# Patient Record
Sex: MALE | Race: WHITE | NOT HISPANIC OR LATINO
[De-identification: names, ages, dates, MRNs, and addresses within clinical notes are randomized per-mention and may not be internally consistent; named-entity substitution may affect disease eponyms.]

---

## 2017-02-03 ENCOUNTER — APPOINTMENT (OUTPATIENT)
Dept: CARDIOLOGY | Facility: CLINIC | Age: 77
End: 2017-02-03

## 2017-02-03 VITALS — RESPIRATION RATE: 18 BRPM | DIASTOLIC BLOOD PRESSURE: 80 MMHG | HEART RATE: 68 BPM | SYSTOLIC BLOOD PRESSURE: 130 MMHG

## 2017-02-03 VITALS — WEIGHT: 231 LBS | BODY MASS INDEX: 35.01 KG/M2 | HEIGHT: 68 IN

## 2017-05-30 ENCOUNTER — OUTPATIENT (OUTPATIENT)
Dept: OUTPATIENT SERVICES | Facility: HOSPITAL | Age: 77
LOS: 1 days | Discharge: HOME | End: 2017-05-30

## 2017-06-02 ENCOUNTER — APPOINTMENT (OUTPATIENT)
Dept: CARDIOLOGY | Facility: CLINIC | Age: 77
End: 2017-06-02

## 2017-06-02 VITALS — BODY MASS INDEX: 33.34 KG/M2 | HEIGHT: 68 IN | WEIGHT: 220 LBS

## 2017-06-02 VITALS — HEART RATE: 64 BPM | SYSTOLIC BLOOD PRESSURE: 130 MMHG | DIASTOLIC BLOOD PRESSURE: 80 MMHG | RESPIRATION RATE: 18 BRPM

## 2017-06-28 DIAGNOSIS — E53.8 DEFICIENCY OF OTHER SPECIFIED B GROUP VITAMINS: ICD-10-CM

## 2017-06-28 DIAGNOSIS — D50.9 IRON DEFICIENCY ANEMIA, UNSPECIFIED: ICD-10-CM

## 2017-06-28 DIAGNOSIS — E78.00 PURE HYPERCHOLESTEROLEMIA, UNSPECIFIED: ICD-10-CM

## 2017-06-28 DIAGNOSIS — D64.9 ANEMIA, UNSPECIFIED: ICD-10-CM

## 2017-06-28 DIAGNOSIS — E11.9 TYPE 2 DIABETES MELLITUS WITHOUT COMPLICATIONS: ICD-10-CM

## 2017-09-21 ENCOUNTER — OUTPATIENT (OUTPATIENT)
Dept: OUTPATIENT SERVICES | Facility: HOSPITAL | Age: 77
LOS: 1 days | Discharge: HOME | End: 2017-09-21

## 2017-09-21 DIAGNOSIS — E53.8 DEFICIENCY OF OTHER SPECIFIED B GROUP VITAMINS: ICD-10-CM

## 2017-09-21 DIAGNOSIS — E11.9 TYPE 2 DIABETES MELLITUS WITHOUT COMPLICATIONS: ICD-10-CM

## 2017-09-21 DIAGNOSIS — E78.5 HYPERLIPIDEMIA, UNSPECIFIED: ICD-10-CM

## 2017-09-21 DIAGNOSIS — R55 SYNCOPE AND COLLAPSE: ICD-10-CM

## 2017-09-21 DIAGNOSIS — N39.0 URINARY TRACT INFECTION, SITE NOT SPECIFIED: ICD-10-CM

## 2017-09-21 DIAGNOSIS — N40.0 BENIGN PROSTATIC HYPERPLASIA WITHOUT LOWER URINARY TRACT SYMPTOMS: ICD-10-CM

## 2017-09-21 DIAGNOSIS — I10 ESSENTIAL (PRIMARY) HYPERTENSION: ICD-10-CM

## 2017-09-21 DIAGNOSIS — D50.9 IRON DEFICIENCY ANEMIA, UNSPECIFIED: ICD-10-CM

## 2017-09-21 DIAGNOSIS — E05.90 THYROTOXICOSIS, UNSPECIFIED WITHOUT THYROTOXIC CRISIS OR STORM: ICD-10-CM

## 2017-09-21 DIAGNOSIS — I25.10 ATHEROSCLEROTIC HEART DISEASE OF NATIVE CORONARY ARTERY WITHOUT ANGINA PECTORIS: ICD-10-CM

## 2017-09-21 DIAGNOSIS — D64.9 ANEMIA, UNSPECIFIED: ICD-10-CM

## 2017-09-21 DIAGNOSIS — N20.0 CALCULUS OF KIDNEY: ICD-10-CM

## 2017-09-22 ENCOUNTER — APPOINTMENT (OUTPATIENT)
Dept: CARDIOLOGY | Facility: CLINIC | Age: 77
End: 2017-09-22

## 2017-09-22 VITALS — WEIGHT: 218 LBS | HEIGHT: 68 IN | BODY MASS INDEX: 33.04 KG/M2

## 2017-09-22 VITALS — SYSTOLIC BLOOD PRESSURE: 130 MMHG | DIASTOLIC BLOOD PRESSURE: 80 MMHG | RESPIRATION RATE: 18 BRPM | HEART RATE: 68 BPM

## 2017-09-22 RX ORDER — CLOTRIMAZOLE AND BETAMETHASONE DIPROPIONATE 10; .5 MG/G; MG/G
1-0.05 CREAM TOPICAL
Qty: 45 | Refills: 0 | Status: ACTIVE | COMMUNITY
Start: 2017-01-30

## 2018-01-15 ENCOUNTER — OUTPATIENT (OUTPATIENT)
Dept: OUTPATIENT SERVICES | Facility: HOSPITAL | Age: 78
LOS: 1 days | Discharge: HOME | End: 2018-01-15

## 2018-01-15 DIAGNOSIS — E53.8 DEFICIENCY OF OTHER SPECIFIED B GROUP VITAMINS: ICD-10-CM

## 2018-01-15 DIAGNOSIS — N39.0 URINARY TRACT INFECTION, SITE NOT SPECIFIED: ICD-10-CM

## 2018-01-15 DIAGNOSIS — I10 ESSENTIAL (PRIMARY) HYPERTENSION: ICD-10-CM

## 2018-01-15 DIAGNOSIS — I25.10 ATHEROSCLEROTIC HEART DISEASE OF NATIVE CORONARY ARTERY WITHOUT ANGINA PECTORIS: ICD-10-CM

## 2018-01-15 DIAGNOSIS — N20.0 CALCULUS OF KIDNEY: ICD-10-CM

## 2018-01-15 DIAGNOSIS — E78.5 HYPERLIPIDEMIA, UNSPECIFIED: ICD-10-CM

## 2018-01-15 DIAGNOSIS — E11.9 TYPE 2 DIABETES MELLITUS WITHOUT COMPLICATIONS: ICD-10-CM

## 2018-01-15 DIAGNOSIS — E05.90 THYROTOXICOSIS, UNSPECIFIED WITHOUT THYROTOXIC CRISIS OR STORM: ICD-10-CM

## 2018-01-15 DIAGNOSIS — R55 SYNCOPE AND COLLAPSE: ICD-10-CM

## 2018-01-15 DIAGNOSIS — D50.9 IRON DEFICIENCY ANEMIA, UNSPECIFIED: ICD-10-CM

## 2018-01-15 DIAGNOSIS — N40.0 BENIGN PROSTATIC HYPERPLASIA WITHOUT LOWER URINARY TRACT SYMPTOMS: ICD-10-CM

## 2018-01-15 DIAGNOSIS — D64.9 ANEMIA, UNSPECIFIED: ICD-10-CM

## 2018-01-17 ENCOUNTER — APPOINTMENT (OUTPATIENT)
Dept: CARDIOLOGY | Facility: CLINIC | Age: 78
End: 2018-01-17

## 2018-01-17 VITALS — BODY MASS INDEX: 33.04 KG/M2 | HEIGHT: 68 IN | WEIGHT: 218 LBS

## 2018-01-17 VITALS — HEART RATE: 72 BPM | RESPIRATION RATE: 18 BRPM | SYSTOLIC BLOOD PRESSURE: 130 MMHG | DIASTOLIC BLOOD PRESSURE: 80 MMHG

## 2018-05-18 ENCOUNTER — OUTPATIENT (OUTPATIENT)
Dept: OUTPATIENT SERVICES | Facility: HOSPITAL | Age: 78
LOS: 1 days | Discharge: HOME | End: 2018-05-18

## 2018-05-18 DIAGNOSIS — D64.9 ANEMIA, UNSPECIFIED: ICD-10-CM

## 2018-05-18 DIAGNOSIS — E05.90 THYROTOXICOSIS, UNSPECIFIED WITHOUT THYROTOXIC CRISIS OR STORM: ICD-10-CM

## 2018-05-18 DIAGNOSIS — E11.9 TYPE 2 DIABETES MELLITUS WITHOUT COMPLICATIONS: ICD-10-CM

## 2018-05-18 DIAGNOSIS — D50.9 IRON DEFICIENCY ANEMIA, UNSPECIFIED: ICD-10-CM

## 2018-05-18 DIAGNOSIS — E53.8 DEFICIENCY OF OTHER SPECIFIED B GROUP VITAMINS: ICD-10-CM

## 2018-05-18 DIAGNOSIS — N39.0 URINARY TRACT INFECTION, SITE NOT SPECIFIED: ICD-10-CM

## 2018-05-18 DIAGNOSIS — E55.9 VITAMIN D DEFICIENCY, UNSPECIFIED: ICD-10-CM

## 2018-05-18 DIAGNOSIS — E78.5 HYPERLIPIDEMIA, UNSPECIFIED: ICD-10-CM

## 2018-05-22 ENCOUNTER — APPOINTMENT (OUTPATIENT)
Dept: CARDIOLOGY | Facility: CLINIC | Age: 78
End: 2018-05-22

## 2018-05-22 VITALS — WEIGHT: 212 LBS | BODY MASS INDEX: 32.13 KG/M2 | HEIGHT: 68 IN

## 2018-09-15 ENCOUNTER — OUTPATIENT (OUTPATIENT)
Dept: OUTPATIENT SERVICES | Facility: HOSPITAL | Age: 78
LOS: 1 days | Discharge: HOME | End: 2018-09-15

## 2018-09-15 DIAGNOSIS — N39.0 URINARY TRACT INFECTION, SITE NOT SPECIFIED: ICD-10-CM

## 2018-09-15 DIAGNOSIS — E05.90 THYROTOXICOSIS, UNSPECIFIED WITHOUT THYROTOXIC CRISIS OR STORM: ICD-10-CM

## 2018-09-15 DIAGNOSIS — D64.9 ANEMIA, UNSPECIFIED: ICD-10-CM

## 2018-09-15 DIAGNOSIS — E11.9 TYPE 2 DIABETES MELLITUS WITHOUT COMPLICATIONS: ICD-10-CM

## 2018-09-15 DIAGNOSIS — E78.5 HYPERLIPIDEMIA, UNSPECIFIED: ICD-10-CM

## 2018-09-24 ENCOUNTER — APPOINTMENT (OUTPATIENT)
Dept: CARDIOLOGY | Facility: CLINIC | Age: 78
End: 2018-09-24

## 2018-09-24 VITALS — DIASTOLIC BLOOD PRESSURE: 80 MMHG | HEART RATE: 72 BPM | RESPIRATION RATE: 18 BRPM | SYSTOLIC BLOOD PRESSURE: 130 MMHG

## 2018-09-24 VITALS — BODY MASS INDEX: 33.04 KG/M2 | WEIGHT: 218 LBS | HEIGHT: 68 IN

## 2018-09-24 DIAGNOSIS — I25.10 ATHEROSCLEROTIC HEART DISEASE OF NATIVE CORONARY ARTERY W/OUT ANGINA PECTORIS: ICD-10-CM

## 2019-02-14 ENCOUNTER — OUTPATIENT (OUTPATIENT)
Dept: OUTPATIENT SERVICES | Facility: HOSPITAL | Age: 79
LOS: 1 days | Discharge: HOME | End: 2019-02-14

## 2019-02-14 DIAGNOSIS — E78.5 HYPERLIPIDEMIA, UNSPECIFIED: ICD-10-CM

## 2019-02-14 DIAGNOSIS — D64.9 ANEMIA, UNSPECIFIED: ICD-10-CM

## 2019-02-14 DIAGNOSIS — E11.9 TYPE 2 DIABETES MELLITUS WITHOUT COMPLICATIONS: ICD-10-CM

## 2019-02-14 DIAGNOSIS — E53.8 DEFICIENCY OF OTHER SPECIFIED B GROUP VITAMINS: ICD-10-CM

## 2019-02-14 DIAGNOSIS — R97.20 ELEVATED PROSTATE SPECIFIC ANTIGEN [PSA]: ICD-10-CM

## 2019-02-14 DIAGNOSIS — E05.90 THYROTOXICOSIS, UNSPECIFIED WITHOUT THYROTOXIC CRISIS OR STORM: ICD-10-CM

## 2019-02-14 DIAGNOSIS — N39.0 URINARY TRACT INFECTION, SITE NOT SPECIFIED: ICD-10-CM

## 2019-02-19 ENCOUNTER — APPOINTMENT (OUTPATIENT)
Dept: CARDIOLOGY | Facility: CLINIC | Age: 79
End: 2019-02-19

## 2019-02-19 VITALS — BODY MASS INDEX: 33.34 KG/M2 | WEIGHT: 220 LBS | HEIGHT: 68 IN

## 2019-02-19 VITALS — DIASTOLIC BLOOD PRESSURE: 80 MMHG | RESPIRATION RATE: 18 BRPM | SYSTOLIC BLOOD PRESSURE: 130 MMHG | HEART RATE: 80 BPM

## 2019-02-19 DIAGNOSIS — Z98.890 OTHER SPECIFIED POSTPROCEDURAL STATES: ICD-10-CM

## 2019-02-19 NOTE — HISTORY OF PRESENT ILLNESS
[FreeTextEntry1] : 78 y.o. 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 No chest pain or shortness of breath. History of hypertension, hyperlipidemia and diabetes . History of back surgery. Back and neck pain has been unchanged. Had right shoulder pain. Saw PMD and had an injection without relief. Slipped and fell on the ice , hurt his chest 6 weeks ago. Better now.

## 2019-02-19 NOTE — PHYSICAL EXAM
[General Appearance - Well Developed] : well developed [Normal Appearance] : normal appearance [Well Groomed] : well groomed [General Appearance - Well Nourished] : well nourished [No Deformities] : no deformities [General Appearance - In No Acute Distress] : no acute distress [Normal Conjunctiva] : the conjunctiva exhibited no abnormalities [Eyelids - No Xanthelasma] : the eyelids demonstrated no xanthelasmas [Normal Oral Mucosa] : normal oral mucosa [No Oral Pallor] : no oral pallor [No Oral Cyanosis] : no oral cyanosis [Normal Jugular Venous A Waves Present] : normal jugular venous A waves present [Normal Jugular Venous V Waves Present] : normal jugular venous V waves present [No Jugular Venous Bran A Waves] : no jugular venous bran A waves [Respiration, Rhythm And Depth] : normal respiratory rhythm and effort [Exaggerated Use Of Accessory Muscles For Inspiration] : no accessory muscle use [Auscultation Breath Sounds / Voice Sounds] : lungs were clear to auscultation bilaterally [Heart Rate And Rhythm] : heart rate and rhythm were normal [Heart Sounds] : normal S1 and S2 [Murmurs] : no murmurs present [Bowel Sounds] : normal bowel sounds [Abdomen Soft] : soft [Abdomen Tenderness] : non-tender [Abdomen Mass (___ Cm)] : no abdominal mass palpated [Abnormal Walk] : normal gait [Nail Clubbing] : no clubbing of the fingernails [Cyanosis, Localized] : no localized cyanosis [Petechial Hemorrhages (___cm)] : no petechial hemorrhages [Skin Color & Pigmentation] : normal skin color and pigmentation [] : no rash [No Venous Stasis] : no venous stasis [Skin Lesions] : no skin lesions [No Skin Ulcers] : no skin ulcer [No Xanthoma] : no  xanthoma was observed [Oriented To Time, Place, And Person] : oriented to person, place, and time

## 2019-06-21 ENCOUNTER — OUTPATIENT (OUTPATIENT)
Dept: OUTPATIENT SERVICES | Facility: HOSPITAL | Age: 79
LOS: 1 days | Discharge: HOME | End: 2019-06-21

## 2019-06-21 DIAGNOSIS — E55.9 VITAMIN D DEFICIENCY, UNSPECIFIED: ICD-10-CM

## 2019-06-21 DIAGNOSIS — E11.9 TYPE 2 DIABETES MELLITUS WITHOUT COMPLICATIONS: ICD-10-CM

## 2019-06-21 DIAGNOSIS — N39.0 URINARY TRACT INFECTION, SITE NOT SPECIFIED: ICD-10-CM

## 2019-06-21 DIAGNOSIS — N40.0 BENIGN PROSTATIC HYPERPLASIA WITHOUT LOWER URINARY TRACT SYMPTOMS: ICD-10-CM

## 2019-06-21 DIAGNOSIS — D64.9 ANEMIA, UNSPECIFIED: ICD-10-CM

## 2019-06-21 DIAGNOSIS — E53.8 DEFICIENCY OF OTHER SPECIFIED B GROUP VITAMINS: ICD-10-CM

## 2019-06-21 DIAGNOSIS — E78.5 HYPERLIPIDEMIA, UNSPECIFIED: ICD-10-CM

## 2019-06-21 DIAGNOSIS — D50.9 IRON DEFICIENCY ANEMIA, UNSPECIFIED: ICD-10-CM

## 2019-07-02 ENCOUNTER — APPOINTMENT (OUTPATIENT)
Dept: CARDIOLOGY | Facility: CLINIC | Age: 79
End: 2019-07-02
Payer: MEDICARE

## 2019-07-02 ENCOUNTER — CHART COPY (OUTPATIENT)
Age: 79
End: 2019-07-02

## 2019-07-02 VITALS — HEART RATE: 72 BPM | SYSTOLIC BLOOD PRESSURE: 126 MMHG | DIASTOLIC BLOOD PRESSURE: 80 MMHG | RESPIRATION RATE: 18 BRPM

## 2019-07-02 VITALS — HEIGHT: 68 IN | BODY MASS INDEX: 33.34 KG/M2 | WEIGHT: 220 LBS

## 2019-07-02 PROCEDURE — 99214 OFFICE O/P EST MOD 30 MIN: CPT

## 2019-07-02 PROCEDURE — 93000 ELECTROCARDIOGRAM COMPLETE: CPT

## 2019-07-02 RX ORDER — LANCETS 33 GAUGE
EACH MISCELLANEOUS
Qty: 200 | Refills: 0 | Status: ACTIVE | COMMUNITY
Start: 2019-02-25

## 2019-07-02 RX ORDER — GABAPENTIN 300 MG/1
300 CAPSULE ORAL
Qty: 540 | Refills: 0 | Status: ACTIVE | COMMUNITY
Start: 2019-02-25

## 2019-07-02 RX ORDER — ISOPROPYL ALCOHOL 0.75 G/1
SWAB TOPICAL
Qty: 300 | Refills: 0 | Status: ACTIVE | COMMUNITY
Start: 2019-02-25

## 2019-07-02 NOTE — REASON FOR VISIT
[Coronary Artery Disease] : coronary artery disease [Hyperlipidemia] : hyperlipidemia [CABG Follow-up] : bypass graft [Hypertension] : hypertension

## 2019-07-02 NOTE — PHYSICAL EXAM
[Well Groomed] : well groomed [Normal Appearance] : normal appearance [General Appearance - Well Developed] : well developed [No Deformities] : no deformities [General Appearance - In No Acute Distress] : no acute distress [General Appearance - Well Nourished] : well nourished [Normal Conjunctiva] : the conjunctiva exhibited no abnormalities [Eyelids - No Xanthelasma] : the eyelids demonstrated no xanthelasmas [No Oral Pallor] : no oral pallor [Normal Oral Mucosa] : normal oral mucosa [No Oral Cyanosis] : no oral cyanosis [Normal Jugular Venous A Waves Present] : normal jugular venous A waves present [Normal Jugular Venous V Waves Present] : normal jugular venous V waves present [No Jugular Venous Bran A Waves] : no jugular venous bran A waves [Heart Rate And Rhythm] : heart rate and rhythm were normal [Heart Sounds] : normal S1 and S2 [Murmurs] : no murmurs present [Respiration, Rhythm And Depth] : normal respiratory rhythm and effort [Exaggerated Use Of Accessory Muscles For Inspiration] : no accessory muscle use [Auscultation Breath Sounds / Voice Sounds] : lungs were clear to auscultation bilaterally [Bowel Sounds] : normal bowel sounds [Abdomen Soft] : soft [Abdomen Tenderness] : non-tender [Abdomen Mass (___ Cm)] : no abdominal mass palpated [Abnormal Walk] : normal gait [Nail Clubbing] : no clubbing of the fingernails [Cyanosis, Localized] : no localized cyanosis [Petechial Hemorrhages (___cm)] : no petechial hemorrhages [Skin Color & Pigmentation] : normal skin color and pigmentation [] : no rash [No Venous Stasis] : no venous stasis [Skin Lesions] : no skin lesions [No Skin Ulcers] : no skin ulcer [No Xanthoma] : no  xanthoma was observed [Oriented To Time, Place, And Person] : oriented to person, place, and time

## 2019-07-02 NOTE — HISTORY OF PRESENT ILLNESS
[FreeTextEntry1] : 79 y.o. 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 No chest pain or shortness of breath. History of hypertension, hyperlipidemia and diabetes . History of back surgery. Back and neck pain has been unchanged. Can walk > 4 mets without chest pain. Had a hypoglycemic episode this morning

## 2019-10-15 ENCOUNTER — OUTPATIENT (OUTPATIENT)
Dept: OUTPATIENT SERVICES | Facility: HOSPITAL | Age: 79
LOS: 1 days | Discharge: HOME | End: 2019-10-15

## 2019-10-15 DIAGNOSIS — E78.5 HYPERLIPIDEMIA, UNSPECIFIED: ICD-10-CM

## 2019-10-15 DIAGNOSIS — N39.0 URINARY TRACT INFECTION, SITE NOT SPECIFIED: ICD-10-CM

## 2019-10-15 DIAGNOSIS — E05.90 THYROTOXICOSIS, UNSPECIFIED WITHOUT THYROTOXIC CRISIS OR STORM: ICD-10-CM

## 2019-10-15 DIAGNOSIS — D64.9 ANEMIA, UNSPECIFIED: ICD-10-CM

## 2019-10-15 DIAGNOSIS — E53.8 DEFICIENCY OF OTHER SPECIFIED B GROUP VITAMINS: ICD-10-CM

## 2019-10-15 DIAGNOSIS — E11.9 TYPE 2 DIABETES MELLITUS WITHOUT COMPLICATIONS: ICD-10-CM

## 2020-01-01 ENCOUNTER — RESULT REVIEW (OUTPATIENT)
Age: 80
End: 2020-01-01

## 2020-01-01 ENCOUNTER — LABORATORY RESULT (OUTPATIENT)
Age: 80
End: 2020-01-01

## 2020-01-01 ENCOUNTER — APPOINTMENT (OUTPATIENT)
Dept: CARDIOLOGY | Facility: CLINIC | Age: 80
End: 2020-01-01
Payer: MEDICARE

## 2020-01-01 ENCOUNTER — OUTPATIENT (OUTPATIENT)
Dept: OUTPATIENT SERVICES | Facility: HOSPITAL | Age: 80
LOS: 1 days | Discharge: HOME | End: 2020-01-01
Payer: MEDICARE

## 2020-01-01 ENCOUNTER — OUTPATIENT (OUTPATIENT)
Dept: OUTPATIENT SERVICES | Facility: HOSPITAL | Age: 80
LOS: 1 days | Discharge: HOME | End: 2020-01-01

## 2020-01-01 VITALS
WEIGHT: 212.97 LBS | DIASTOLIC BLOOD PRESSURE: 106 MMHG | SYSTOLIC BLOOD PRESSURE: 208 MMHG | TEMPERATURE: 98 F | HEART RATE: 61 BPM | HEIGHT: 67 IN | RESPIRATION RATE: 18 BRPM | OXYGEN SATURATION: 98 %

## 2020-01-01 VITALS
HEART RATE: 60 BPM | HEIGHT: 67 IN | OXYGEN SATURATION: 100 % | WEIGHT: 212.97 LBS | DIASTOLIC BLOOD PRESSURE: 92 MMHG | SYSTOLIC BLOOD PRESSURE: 170 MMHG | TEMPERATURE: 97 F | RESPIRATION RATE: 18 BRPM

## 2020-01-01 VITALS
OXYGEN SATURATION: 95 % | RESPIRATION RATE: 17 BRPM | HEART RATE: 78 BPM | SYSTOLIC BLOOD PRESSURE: 177 MMHG | DIASTOLIC BLOOD PRESSURE: 88 MMHG

## 2020-01-01 VITALS
WEIGHT: 217 LBS | TEMPERATURE: 97.8 F | HEIGHT: 68 IN | HEART RATE: 65 BPM | BODY MASS INDEX: 32.89 KG/M2 | DIASTOLIC BLOOD PRESSURE: 80 MMHG | SYSTOLIC BLOOD PRESSURE: 140 MMHG

## 2020-01-01 DIAGNOSIS — S83.231D COMPLEX TEAR OF MEDIAL MENISCUS, CURRENT INJURY, RIGHT KNEE, SUBSEQUENT ENCOUNTER: ICD-10-CM

## 2020-01-01 DIAGNOSIS — Z98.890 OTHER SPECIFIED POSTPROCEDURAL STATES: Chronic | ICD-10-CM

## 2020-01-01 DIAGNOSIS — Z01.818 ENCOUNTER FOR OTHER PREPROCEDURAL EXAMINATION: ICD-10-CM

## 2020-01-01 DIAGNOSIS — I10 ESSENTIAL (PRIMARY) HYPERTENSION: ICD-10-CM

## 2020-01-01 DIAGNOSIS — Z87.891 PERSONAL HISTORY OF NICOTINE DEPENDENCE: ICD-10-CM

## 2020-01-01 DIAGNOSIS — Z95.1 PRESENCE OF AORTOCORONARY BYPASS GRAFT: ICD-10-CM

## 2020-01-01 DIAGNOSIS — Z95.1 PRESENCE OF AORTOCORONARY BYPASS GRAFT: Chronic | ICD-10-CM

## 2020-01-01 DIAGNOSIS — M23.231 DERANGEMENT OF OTHER MEDIAL MENISCUS DUE TO OLD TEAR OR INJURY, RIGHT KNEE: ICD-10-CM

## 2020-01-01 DIAGNOSIS — I25.10 ATHEROSCLEROTIC HEART DISEASE OF NATIVE CORONARY ARTERY WITHOUT ANGINA PECTORIS: ICD-10-CM

## 2020-01-01 DIAGNOSIS — E78.5 HYPERLIPIDEMIA, UNSPECIFIED: ICD-10-CM

## 2020-01-01 DIAGNOSIS — Z79.4 LONG TERM (CURRENT) USE OF INSULIN: ICD-10-CM

## 2020-01-01 DIAGNOSIS — I25.2 OLD MYOCARDIAL INFARCTION: ICD-10-CM

## 2020-01-01 DIAGNOSIS — Z11.59 ENCOUNTER FOR SCREENING FOR OTHER VIRAL DISEASES: ICD-10-CM

## 2020-01-01 DIAGNOSIS — M25.461 EFFUSION, RIGHT KNEE: ICD-10-CM

## 2020-01-01 DIAGNOSIS — E11.9 TYPE 2 DIABETES MELLITUS WITHOUT COMPLICATIONS: ICD-10-CM

## 2020-01-01 DIAGNOSIS — S83.231A COMPLEX TEAR OF MEDIAL MENISCUS, CURRENT INJURY, RIGHT KNEE, INITIAL ENCOUNTER: ICD-10-CM

## 2020-01-01 DIAGNOSIS — Z79.82 LONG TERM (CURRENT) USE OF ASPIRIN: ICD-10-CM

## 2020-01-01 DIAGNOSIS — E78.5 HYPERLIPIDEMIA, UNSPECIFIED: Chronic | ICD-10-CM

## 2020-01-01 LAB
A1C WITH ESTIMATED AVERAGE GLUCOSE RESULT: 8.2 % — HIGH (ref 4–5.6)
ALBUMIN SERPL ELPH-MCNC: 4.2 G/DL — SIGNIFICANT CHANGE UP (ref 3.5–5.2)
ALP SERPL-CCNC: 76 U/L — SIGNIFICANT CHANGE UP (ref 30–115)
ALT FLD-CCNC: 14 U/L — SIGNIFICANT CHANGE UP (ref 0–41)
ANION GAP SERPL CALC-SCNC: 13 MMOL/L — SIGNIFICANT CHANGE UP (ref 7–14)
APTT BLD: 29.8 SEC — SIGNIFICANT CHANGE UP (ref 27–39.2)
AST SERPL-CCNC: 17 U/L — SIGNIFICANT CHANGE UP (ref 0–41)
BASOPHILS # BLD AUTO: 0.05 K/UL — SIGNIFICANT CHANGE UP (ref 0–0.2)
BASOPHILS NFR BLD AUTO: 0.7 % — SIGNIFICANT CHANGE UP (ref 0–1)
BILIRUB SERPL-MCNC: 1.3 MG/DL — HIGH (ref 0.2–1.2)
BUN SERPL-MCNC: 23 MG/DL — HIGH (ref 10–20)
CALCIUM SERPL-MCNC: 9.3 MG/DL — SIGNIFICANT CHANGE UP (ref 8.5–10.1)
CHLORIDE SERPL-SCNC: 100 MMOL/L — SIGNIFICANT CHANGE UP (ref 98–110)
CO2 SERPL-SCNC: 24 MMOL/L — SIGNIFICANT CHANGE UP (ref 17–32)
CREAT SERPL-MCNC: 1.3 MG/DL — SIGNIFICANT CHANGE UP (ref 0.7–1.5)
EOSINOPHIL # BLD AUTO: 0.05 K/UL — SIGNIFICANT CHANGE UP (ref 0–0.7)
EOSINOPHIL NFR BLD AUTO: 0.7 % — SIGNIFICANT CHANGE UP (ref 0–8)
ESTIMATED AVERAGE GLUCOSE: 189 MG/DL — HIGH (ref 68–114)
GLUCOSE BLDC GLUCOMTR-MCNC: 203 MG/DL — HIGH (ref 70–99)
GLUCOSE SERPL-MCNC: 129 MG/DL — HIGH (ref 70–99)
HCT VFR BLD CALC: 46.2 % — SIGNIFICANT CHANGE UP (ref 42–52)
HGB BLD-MCNC: 15.2 G/DL — SIGNIFICANT CHANGE UP (ref 14–18)
IMM GRANULOCYTES NFR BLD AUTO: 0.3 % — SIGNIFICANT CHANGE UP (ref 0.1–0.3)
INR BLD: 1.07 RATIO — SIGNIFICANT CHANGE UP (ref 0.65–1.3)
LYMPHOCYTES # BLD AUTO: 2.04 K/UL — SIGNIFICANT CHANGE UP (ref 1.2–3.4)
LYMPHOCYTES # BLD AUTO: 29.7 % — SIGNIFICANT CHANGE UP (ref 20.5–51.1)
MCHC RBC-ENTMCNC: 29.1 PG — SIGNIFICANT CHANGE UP (ref 27–31)
MCHC RBC-ENTMCNC: 32.9 G/DL — SIGNIFICANT CHANGE UP (ref 32–37)
MCV RBC AUTO: 88.5 FL — SIGNIFICANT CHANGE UP (ref 80–94)
MONOCYTES # BLD AUTO: 0.5 K/UL — SIGNIFICANT CHANGE UP (ref 0.1–0.6)
MONOCYTES NFR BLD AUTO: 7.3 % — SIGNIFICANT CHANGE UP (ref 1.7–9.3)
NEUTROPHILS # BLD AUTO: 4.21 K/UL — SIGNIFICANT CHANGE UP (ref 1.4–6.5)
NEUTROPHILS NFR BLD AUTO: 61.3 % — SIGNIFICANT CHANGE UP (ref 42.2–75.2)
NRBC # BLD: 0 /100 WBCS — SIGNIFICANT CHANGE UP (ref 0–0)
PLATELET # BLD AUTO: 179 K/UL — SIGNIFICANT CHANGE UP (ref 130–400)
POTASSIUM SERPL-MCNC: 4.3 MMOL/L — SIGNIFICANT CHANGE UP (ref 3.5–5)
POTASSIUM SERPL-SCNC: 4.3 MMOL/L — SIGNIFICANT CHANGE UP (ref 3.5–5)
PROT SERPL-MCNC: 7.3 G/DL — SIGNIFICANT CHANGE UP (ref 6–8)
PROTHROM AB SERPL-ACNC: 12.3 SEC — SIGNIFICANT CHANGE UP (ref 9.95–12.87)
RBC # BLD: 5.22 M/UL — SIGNIFICANT CHANGE UP (ref 4.7–6.1)
RBC # FLD: 14.9 % — HIGH (ref 11.5–14.5)
SODIUM SERPL-SCNC: 137 MMOL/L — SIGNIFICANT CHANGE UP (ref 135–146)
SURGICAL PATHOLOGY STUDY: SIGNIFICANT CHANGE UP
WBC # BLD: 6.87 K/UL — SIGNIFICANT CHANGE UP (ref 4.8–10.8)
WBC # FLD AUTO: 6.87 K/UL — SIGNIFICANT CHANGE UP (ref 4.8–10.8)

## 2020-01-01 PROCEDURE — 99072 ADDL SUPL MATRL&STAF TM PHE: CPT

## 2020-01-01 PROCEDURE — 88304 TISSUE EXAM BY PATHOLOGIST: CPT | Mod: 26

## 2020-01-01 PROCEDURE — 93306 TTE W/DOPPLER COMPLETE: CPT

## 2020-01-01 PROCEDURE — 99215 OFFICE O/P EST HI 40 MIN: CPT

## 2020-01-01 PROCEDURE — 93010 ELECTROCARDIOGRAM REPORT: CPT

## 2020-01-01 PROCEDURE — 93000 ELECTROCARDIOGRAM COMPLETE: CPT

## 2020-01-01 RX ORDER — SODIUM CHLORIDE 9 MG/ML
1000 INJECTION, SOLUTION INTRAVENOUS
Refills: 0 | Status: DISCONTINUED | OUTPATIENT
Start: 2020-01-01 | End: 2021-01-01

## 2020-01-01 RX ORDER — MORPHINE SULFATE 50 MG/1
2 CAPSULE, EXTENDED RELEASE ORAL
Refills: 0 | Status: DISCONTINUED | OUTPATIENT
Start: 2020-01-01 | End: 2020-01-01

## 2020-01-01 RX ORDER — ONDANSETRON 8 MG/1
4 TABLET, FILM COATED ORAL ONCE
Refills: 0 | Status: DISCONTINUED | OUTPATIENT
Start: 2020-01-01 | End: 2021-01-01

## 2020-01-01 RX ORDER — ACETAMINOPHEN 500 MG
650 TABLET ORAL ONCE
Refills: 0 | Status: DISCONTINUED | OUTPATIENT
Start: 2020-01-01 | End: 2021-01-01

## 2020-01-01 RX ORDER — HYDROMORPHONE HYDROCHLORIDE 2 MG/ML
0.5 INJECTION INTRAMUSCULAR; INTRAVENOUS; SUBCUTANEOUS
Refills: 0 | Status: DISCONTINUED | OUTPATIENT
Start: 2020-01-01 | End: 2020-01-01

## 2020-01-01 RX ORDER — CEPHALEXIN 500 MG/1
500 CAPSULE ORAL
Qty: 28 | Refills: 0 | Status: DISCONTINUED | COMMUNITY
Start: 2019-06-25 | End: 2020-01-01

## 2020-01-01 RX ADMIN — HYDROMORPHONE HYDROCHLORIDE 0.5 MILLIGRAM(S): 2 INJECTION INTRAMUSCULAR; INTRAVENOUS; SUBCUTANEOUS at 12:24

## 2020-01-01 RX ADMIN — HYDROMORPHONE HYDROCHLORIDE 0.5 MILLIGRAM(S): 2 INJECTION INTRAMUSCULAR; INTRAVENOUS; SUBCUTANEOUS at 12:53

## 2020-01-01 RX ADMIN — HYDROMORPHONE HYDROCHLORIDE 0.5 MILLIGRAM(S): 2 INJECTION INTRAMUSCULAR; INTRAVENOUS; SUBCUTANEOUS at 12:38

## 2020-06-12 ENCOUNTER — TRANSCRIPTION ENCOUNTER (OUTPATIENT)
Age: 80
End: 2020-06-12

## 2020-08-18 ENCOUNTER — APPOINTMENT (OUTPATIENT)
Dept: CARDIOLOGY | Facility: CLINIC | Age: 80
End: 2020-08-18
Payer: MEDICARE

## 2020-08-18 VITALS — BODY MASS INDEX: 32.43 KG/M2 | HEIGHT: 68 IN | WEIGHT: 214 LBS | TEMPERATURE: 97.2 F

## 2020-08-18 VITALS — DIASTOLIC BLOOD PRESSURE: 80 MMHG | SYSTOLIC BLOOD PRESSURE: 118 MMHG

## 2020-08-18 VITALS — RESPIRATION RATE: 18 BRPM | HEART RATE: 68 BPM

## 2020-08-18 PROCEDURE — 99214 OFFICE O/P EST MOD 30 MIN: CPT

## 2020-08-18 PROCEDURE — 93000 ELECTROCARDIOGRAM COMPLETE: CPT

## 2020-08-18 NOTE — HISTORY OF PRESENT ILLNESS
[FreeTextEntry1] : 80 y.o. 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 No chest pain or shortness of breath. History of hypertension, hyperlipidemia and diabetes . History of back surgery. Back and neck pain has been stable. Can walk > 4 mets without chest pain. Fell in June, seeing a Chiropractor.

## 2020-08-18 NOTE — PHYSICAL EXAM
[Normal Appearance] : normal appearance [General Appearance - Well Developed] : well developed [Well Groomed] : well groomed [General Appearance - Well Nourished] : well nourished [No Deformities] : no deformities [General Appearance - In No Acute Distress] : no acute distress [Normal Conjunctiva] : the conjunctiva exhibited no abnormalities [Eyelids - No Xanthelasma] : the eyelids demonstrated no xanthelasmas [Normal Oral Mucosa] : normal oral mucosa [No Oral Pallor] : no oral pallor [No Oral Cyanosis] : no oral cyanosis [Normal Jugular Venous A Waves Present] : normal jugular venous A waves present [Normal Jugular Venous V Waves Present] : normal jugular venous V waves present [No Jugular Venous Bran A Waves] : no jugular venous bran A waves [Heart Sounds] : normal S1 and S2 [Heart Rate And Rhythm] : heart rate and rhythm were normal [Murmurs] : no murmurs present [Exaggerated Use Of Accessory Muscles For Inspiration] : no accessory muscle use [Respiration, Rhythm And Depth] : normal respiratory rhythm and effort [Auscultation Breath Sounds / Voice Sounds] : lungs were clear to auscultation bilaterally [Abdomen Tenderness] : non-tender [Bowel Sounds] : normal bowel sounds [Abdomen Soft] : soft [Abdomen Mass (___ Cm)] : no abdominal mass palpated [Nail Clubbing] : no clubbing of the fingernails [Abnormal Walk] : normal gait [Cyanosis, Localized] : no localized cyanosis [Petechial Hemorrhages (___cm)] : no petechial hemorrhages [] : no rash [Skin Color & Pigmentation] : normal skin color and pigmentation [No Venous Stasis] : no venous stasis [Skin Lesions] : no skin lesions [No Skin Ulcers] : no skin ulcer [No Xanthoma] : no  xanthoma was observed [Oriented To Time, Place, And Person] : oriented to person, place, and time

## 2020-12-11 NOTE — H&P PST ADULT - ADDITIONAL PE
NO lucas  no loose teeth, poor dental care from neck tmd> 3 fbd . difficulty in movement of neck down, crowded airway class IV, supple neck.

## 2020-12-11 NOTE — H&P PST ADULT - HISTORY OF PRESENT ILLNESS
79 y/o male presents to PAST for surgical arthroscopy right knee with DR. Morales in [facility] under [place] on 12/18/20    Pt states had a fall in 6/2020, following fall has had right knee pain. Pt had physical therapy and injections that did not alleviate the pain. PT is now ready for above procedure for pain relief.     Pt walks with care since fall in 6/2020.     PATIENT CURRENTLY DENIES CHEST PAIN  SHORTNESS OF BREATH  PALPITATIONS,  DYSURIA, OR UPPER RESPIRATORY INFECTION IN PAST 2 WEEKS  EXERCISE  TOLERANCE  1 flight FLIGHT OF STAIRS  WITHOUT SHORTNESS OF BREATH    denies travel outside the [place] in the past 30 days    pt denies any covid s/s, or tested positive in the past  pt advised self quarantine till day of procedure    Anesthesia Alert  NO--Difficult Airway  Yes--History of neck surgery or radiation, neck surgery 3 yrs ago  Yes--Limited ROM of neck, difficult to move neck up  NO--History of Malignant hyperthermia  NO--No personal or family history of Pseudocholinesterase deficiency.  NO--Prior Anesthesia Complication  NO--Latex Allergy  NO--Loose teeth, poor dentation  NO--History of Rheumatoid Arthritis  NO--DOE  NO--Other_____    ^S83.231A, [id number], [id number],

## 2020-12-11 NOTE — H&P PST ADULT - NSICDXPASTSURGICALHX_GEN_ALL_CORE_FT
PAST SURGICAL HISTORY:  H/O neck surgery 2017    Previous back surgery 18 yrs ago    S/P CABG x 1 21 years ago

## 2020-12-11 NOTE — H&P PST ADULT - REASON FOR ADMISSION
79 y/o male presents to PAST for surgical arthroscopy right knee with DR. Morales in [facility] under [place] on 12/18/20

## 2020-12-11 NOTE — H&P PST ADULT - NSICDXPASTMEDICALHX_GEN_ALL_CORE_FT
PAST MEDICAL HISTORY:  Atherosclerotic heart disease     Diabetes     Heart attack     Hyperlipidemia     Hypertension

## 2020-12-14 PROBLEM — I10 ESSENTIAL (PRIMARY) HYPERTENSION: Chronic | Status: ACTIVE | Noted: 2020-01-01

## 2020-12-14 PROBLEM — I25.10 ATHEROSCLEROTIC HEART DISEASE OF NATIVE CORONARY ARTERY WITHOUT ANGINA PECTORIS: Chronic | Status: ACTIVE | Noted: 2020-01-01

## 2020-12-14 PROBLEM — I21.9 ACUTE MYOCARDIAL INFARCTION, UNSPECIFIED: Chronic | Status: ACTIVE | Noted: 2020-01-01

## 2020-12-14 PROBLEM — E78.5 HYPERLIPIDEMIA, UNSPECIFIED: Chronic | Status: ACTIVE | Noted: 2020-01-01

## 2020-12-14 PROBLEM — E11.9 TYPE 2 DIABETES MELLITUS WITHOUT COMPLICATIONS: Chronic | Status: ACTIVE | Noted: 2020-01-01

## 2020-12-18 NOTE — CHART NOTE - NSCHARTNOTEFT_GEN_A_CORE
PACU ANESTHESIA ADMISSION NOTE      Procedure: Medial meniscectomy      Post op diagnosis:  Medial meniscus tear        ____  Intubated  TV:______       Rate: ______      FiO2: ______    __x__  Patent Airway    __x__  Full return of protective reflexes    __x__  Full recovery from anesthesia / back to baseline     Vitals:   T:  97.9         R:     14             BP:   200/106               Sat:  95                 P: 85      Mental Status:  __x__ Awake   __x___ Alert   _____ Drowsy   _____ Sedated    Nausea/Vomiting:  __x__ NO  ______Yes,   See Post - Op Orders          Pain Scale (0-10):  __0___    Treatment: ____ None    ___x_ See Post - Op/PCA Orders    Post - Operative Fluids:   ____ Oral   __x__ See Post - Op Orders    Plan: Discharge:   __x__Home       _____Floor     _____Critical Care    _____  Other:_________________    Comments:  pt tolerated procedure well, pt transferred to PACU and report was given to PACU RN, BP elevated at baseline, pt shows no signs of distress. no anesthesia complications, discharge pt when criteria met.

## 2021-01-01 ENCOUNTER — OUTPATIENT (OUTPATIENT)
Dept: OUTPATIENT SERVICES | Facility: HOSPITAL | Age: 81
LOS: 1 days | Discharge: HOME | End: 2021-01-01

## 2021-01-01 ENCOUNTER — TRANSCRIPTION ENCOUNTER (OUTPATIENT)
Age: 81
End: 2021-01-01

## 2021-01-01 ENCOUNTER — FORM ENCOUNTER (OUTPATIENT)
Age: 81
End: 2021-01-01

## 2021-01-01 ENCOUNTER — EMERGENCY (EMERGENCY)
Facility: HOSPITAL | Age: 81
LOS: 0 days | Discharge: HOME | End: 2021-09-10
Attending: EMERGENCY MEDICINE | Admitting: EMERGENCY MEDICINE
Payer: MEDICARE

## 2021-01-01 ENCOUNTER — APPOINTMENT (OUTPATIENT)
Age: 81
End: 2021-01-01

## 2021-01-01 ENCOUNTER — APPOINTMENT (OUTPATIENT)
Dept: CARDIOLOGY | Facility: CLINIC | Age: 81
End: 2021-01-01

## 2021-01-01 ENCOUNTER — INPATIENT (INPATIENT)
Facility: HOSPITAL | Age: 81
LOS: 5 days | End: 2021-09-23
Attending: INTERNAL MEDICINE | Admitting: INTERNAL MEDICINE
Payer: MEDICARE

## 2021-01-01 ENCOUNTER — APPOINTMENT (OUTPATIENT)
Dept: CARDIOLOGY | Facility: CLINIC | Age: 81
End: 2021-01-01
Payer: MEDICARE

## 2021-01-01 ENCOUNTER — OUTPATIENT (OUTPATIENT)
Dept: INPATIENT UNIT | Facility: HOSPITAL | Age: 81
LOS: 1 days | Discharge: HOME | End: 2021-01-01

## 2021-01-01 VITALS
TEMPERATURE: 97 F | RESPIRATION RATE: 20 BRPM | HEIGHT: 67 IN | OXYGEN SATURATION: 94 % | DIASTOLIC BLOOD PRESSURE: 83 MMHG | WEIGHT: 199.96 LBS | HEART RATE: 105 BPM | SYSTOLIC BLOOD PRESSURE: 142 MMHG

## 2021-01-01 VITALS
HEIGHT: 67 IN | OXYGEN SATURATION: 96 % | WEIGHT: 205.03 LBS | TEMPERATURE: 98 F | DIASTOLIC BLOOD PRESSURE: 85 MMHG | HEART RATE: 78 BPM | SYSTOLIC BLOOD PRESSURE: 180 MMHG | RESPIRATION RATE: 18 BRPM

## 2021-01-01 VITALS — HEART RATE: 64 BPM | SYSTOLIC BLOOD PRESSURE: 130 MMHG | DIASTOLIC BLOOD PRESSURE: 80 MMHG | RESPIRATION RATE: 18 BRPM

## 2021-01-01 VITALS — HEIGHT: 68 IN | BODY MASS INDEX: 31.22 KG/M2 | WEIGHT: 206 LBS

## 2021-01-01 VITALS — TEMPERATURE: 97.6 F | WEIGHT: 211 LBS | HEIGHT: 68 IN | BODY MASS INDEX: 31.98 KG/M2

## 2021-01-01 VITALS
HEART RATE: 79 BPM | TEMPERATURE: 98 F | RESPIRATION RATE: 18 BRPM | DIASTOLIC BLOOD PRESSURE: 69 MMHG | SYSTOLIC BLOOD PRESSURE: 135 MMHG | OXYGEN SATURATION: 93 % | WEIGHT: 197.09 LBS | HEIGHT: 67 IN

## 2021-01-01 VITALS
OXYGEN SATURATION: 96 % | DIASTOLIC BLOOD PRESSURE: 84 MMHG | RESPIRATION RATE: 18 BRPM | SYSTOLIC BLOOD PRESSURE: 141 MMHG | HEART RATE: 82 BPM | TEMPERATURE: 99 F

## 2021-01-01 VITALS
RESPIRATION RATE: 19 BRPM | TEMPERATURE: 98 F | SYSTOLIC BLOOD PRESSURE: 141 MMHG | OXYGEN SATURATION: 95 % | DIASTOLIC BLOOD PRESSURE: 85 MMHG | HEART RATE: 90 BPM

## 2021-01-01 VITALS — SYSTOLIC BLOOD PRESSURE: 178 MMHG | DIASTOLIC BLOOD PRESSURE: 95 MMHG | HEART RATE: 78 BPM

## 2021-01-01 VITALS
SYSTOLIC BLOOD PRESSURE: 198 MMHG | HEART RATE: 69 BPM | RESPIRATION RATE: 20 BRPM | TEMPERATURE: 98 F | DIASTOLIC BLOOD PRESSURE: 98 MMHG | OXYGEN SATURATION: 95 %

## 2021-01-01 VITALS — OXYGEN SATURATION: 96 %

## 2021-01-01 DIAGNOSIS — R09.89 OTHER SPECIFIED SYMPTOMS AND SIGNS INVOLVING THE CIRCULATORY AND RESPIRATORY SYSTEMS: ICD-10-CM

## 2021-01-01 DIAGNOSIS — E11.9 TYPE 2 DIABETES MELLITUS WITHOUT COMPLICATIONS: ICD-10-CM

## 2021-01-01 DIAGNOSIS — I25.10 ATHEROSCLEROTIC HEART DISEASE OF NATIVE CORONARY ARTERY WITHOUT ANGINA PECTORIS: ICD-10-CM

## 2021-01-01 DIAGNOSIS — Z79.4 LONG TERM (CURRENT) USE OF INSULIN: ICD-10-CM

## 2021-01-01 DIAGNOSIS — Z95.1 PRESENCE OF AORTOCORONARY BYPASS GRAFT: ICD-10-CM

## 2021-01-01 DIAGNOSIS — I10 ESSENTIAL (PRIMARY) HYPERTENSION: ICD-10-CM

## 2021-01-01 DIAGNOSIS — E11.9 TYPE 2 DIABETES MELLITUS W/OUT COMPLICATIONS: ICD-10-CM

## 2021-01-01 DIAGNOSIS — I70.90 UNSPECIFIED ATHEROSCLEROSIS: ICD-10-CM

## 2021-01-01 DIAGNOSIS — Z79.82 LONG TERM (CURRENT) USE OF ASPIRIN: ICD-10-CM

## 2021-01-01 DIAGNOSIS — Z98.890 OTHER SPECIFIED POSTPROCEDURAL STATES: Chronic | ICD-10-CM

## 2021-01-01 DIAGNOSIS — E78.5 HYPERLIPIDEMIA, UNSPECIFIED: ICD-10-CM

## 2021-01-01 DIAGNOSIS — Z95.1 PRESENCE OF AORTOCORONARY BYPASS GRAFT: Chronic | ICD-10-CM

## 2021-01-01 DIAGNOSIS — I26.99 OTHER PULMONARY EMBOLISM WITHOUT ACUTE COR PULMONALE: ICD-10-CM

## 2021-01-01 DIAGNOSIS — Z11.59 ENCOUNTER FOR SCREENING FOR OTHER VIRAL DISEASES: ICD-10-CM

## 2021-01-01 DIAGNOSIS — J12.82 PNEUMONIA DUE TO CORONAVIRUS DISEASE 2019: ICD-10-CM

## 2021-01-01 DIAGNOSIS — U07.1 COVID-19: ICD-10-CM

## 2021-01-01 DIAGNOSIS — R07.9 CHEST PAIN, UNSPECIFIED: ICD-10-CM

## 2021-01-01 DIAGNOSIS — N18.30 CHRONIC KIDNEY DISEASE, STAGE 3 UNSPECIFIED: ICD-10-CM

## 2021-01-01 DIAGNOSIS — L60.0 INGROWING NAIL: ICD-10-CM

## 2021-01-01 DIAGNOSIS — Z79.899 OTHER LONG TERM (CURRENT) DRUG THERAPY: ICD-10-CM

## 2021-01-01 LAB
ALBUMIN SERPL ELPH-MCNC: 2.9 G/DL — LOW (ref 3.5–5.2)
ALBUMIN SERPL ELPH-MCNC: 3.1 G/DL — LOW (ref 3.5–5.2)
ALBUMIN SERPL ELPH-MCNC: 3.2 G/DL — LOW (ref 3.5–5.2)
ALBUMIN SERPL ELPH-MCNC: 3.3 G/DL — LOW (ref 3.5–5.2)
ALP SERPL-CCNC: 106 U/L — SIGNIFICANT CHANGE UP (ref 30–115)
ALP SERPL-CCNC: 118 U/L — HIGH (ref 30–115)
ALP SERPL-CCNC: 135 U/L — HIGH (ref 30–115)
ALP SERPL-CCNC: 71 U/L — SIGNIFICANT CHANGE UP (ref 30–115)
ALT FLD-CCNC: 32 U/L — SIGNIFICANT CHANGE UP (ref 0–41)
ALT FLD-CCNC: 50 U/L — HIGH (ref 0–41)
ALT FLD-CCNC: 65 U/L — HIGH (ref 0–41)
ALT FLD-CCNC: 66 U/L — HIGH (ref 0–41)
ANION GAP SERPL CALC-SCNC: 12 MMOL/L — SIGNIFICANT CHANGE UP (ref 7–14)
ANION GAP SERPL CALC-SCNC: 14 MMOL/L — SIGNIFICANT CHANGE UP (ref 7–14)
ANION GAP SERPL CALC-SCNC: 15 MMOL/L — HIGH (ref 7–14)
ANION GAP SERPL CALC-SCNC: 18 MMOL/L — HIGH (ref 7–14)
ANION GAP SERPL CALC-SCNC: 18 MMOL/L — HIGH (ref 7–14)
ANION GAP SERPL CALC-SCNC: 24 MMOL/L — HIGH (ref 7–14)
ANION GAP SERPL CALC-SCNC: 28 MMOL/L — HIGH (ref 7–14)
APPEARANCE UR: CLEAR — SIGNIFICANT CHANGE UP
APPEARANCE UR: CLEAR — SIGNIFICANT CHANGE UP
APTT BLD: 29 SEC — SIGNIFICANT CHANGE UP (ref 27–39.2)
APTT BLD: 48 SEC — HIGH (ref 27–39.2)
AST SERPL-CCNC: 56 U/L — HIGH (ref 0–41)
AST SERPL-CCNC: 57 U/L — HIGH (ref 0–41)
AST SERPL-CCNC: 62 U/L — HIGH (ref 0–41)
AST SERPL-CCNC: 68 U/L — HIGH (ref 0–41)
B-OH-BUTYR SERPL-SCNC: 0.19 MMOL/L — SIGNIFICANT CHANGE UP
BACTERIA # UR AUTO: NEGATIVE — SIGNIFICANT CHANGE UP
BASE EXCESS BLDV CALC-SCNC: -3.4 MMOL/L — LOW (ref -2–3)
BASOPHILS # BLD AUTO: 0.01 K/UL — SIGNIFICANT CHANGE UP (ref 0–0.2)
BASOPHILS # BLD AUTO: 0.01 K/UL — SIGNIFICANT CHANGE UP (ref 0–0.2)
BASOPHILS # BLD AUTO: 0.02 K/UL — SIGNIFICANT CHANGE UP (ref 0–0.2)
BASOPHILS # BLD AUTO: 0.02 K/UL — SIGNIFICANT CHANGE UP (ref 0–0.2)
BASOPHILS NFR BLD AUTO: 0.1 % — SIGNIFICANT CHANGE UP (ref 0–1)
BASOPHILS NFR BLD AUTO: 0.1 % — SIGNIFICANT CHANGE UP (ref 0–1)
BASOPHILS NFR BLD AUTO: 0.2 % — SIGNIFICANT CHANGE UP (ref 0–1)
BASOPHILS NFR BLD AUTO: 0.3 % — SIGNIFICANT CHANGE UP (ref 0–1)
BILIRUB SERPL-MCNC: 0.7 MG/DL — SIGNIFICANT CHANGE UP (ref 0.2–1.2)
BILIRUB SERPL-MCNC: 0.9 MG/DL — SIGNIFICANT CHANGE UP (ref 0.2–1.2)
BILIRUB SERPL-MCNC: 1.2 MG/DL — SIGNIFICANT CHANGE UP (ref 0.2–1.2)
BILIRUB SERPL-MCNC: 1.3 MG/DL — HIGH (ref 0.2–1.2)
BILIRUB UR-MCNC: NEGATIVE — SIGNIFICANT CHANGE UP
BILIRUB UR-MCNC: NEGATIVE — SIGNIFICANT CHANGE UP
BUN SERPL-MCNC: 119 MG/DL — CRITICAL HIGH (ref 10–20)
BUN SERPL-MCNC: 28 MG/DL — HIGH (ref 10–20)
BUN SERPL-MCNC: 29 MG/DL — HIGH (ref 10–20)
BUN SERPL-MCNC: 36 MG/DL — HIGH (ref 10–20)
BUN SERPL-MCNC: 39 MG/DL — HIGH (ref 10–20)
BUN SERPL-MCNC: 53 MG/DL — HIGH (ref 10–20)
BUN SERPL-MCNC: 99 MG/DL — CRITICAL HIGH (ref 10–20)
CA-I SERPL-SCNC: 1.17 MMOL/L — SIGNIFICANT CHANGE UP (ref 1.15–1.33)
CALCIUM SERPL-MCNC: 8 MG/DL — LOW (ref 8.5–10.1)
CALCIUM SERPL-MCNC: 8 MG/DL — LOW (ref 8.5–10.1)
CALCIUM SERPL-MCNC: 8.2 MG/DL — LOW (ref 8.5–10.1)
CALCIUM SERPL-MCNC: 8.3 MG/DL — LOW (ref 8.5–10.1)
CALCIUM SERPL-MCNC: 8.3 MG/DL — LOW (ref 8.5–10.1)
CALCIUM SERPL-MCNC: 8.5 MG/DL — SIGNIFICANT CHANGE UP (ref 8.5–10.1)
CALCIUM SERPL-MCNC: 8.7 MG/DL — SIGNIFICANT CHANGE UP (ref 8.5–10.1)
CHLORIDE SERPL-SCNC: 101 MMOL/L — SIGNIFICANT CHANGE UP (ref 98–110)
CHLORIDE SERPL-SCNC: 102 MMOL/L — SIGNIFICANT CHANGE UP (ref 98–110)
CHLORIDE SERPL-SCNC: 103 MMOL/L — SIGNIFICANT CHANGE UP (ref 98–110)
CHLORIDE SERPL-SCNC: 95 MMOL/L — LOW (ref 98–110)
CHLORIDE SERPL-SCNC: 96 MMOL/L — LOW (ref 98–110)
CHLORIDE SERPL-SCNC: 98 MMOL/L — SIGNIFICANT CHANGE UP (ref 98–110)
CHLORIDE SERPL-SCNC: 99 MMOL/L — SIGNIFICANT CHANGE UP (ref 98–110)
CO2 SERPL-SCNC: 11 MMOL/L — LOW (ref 17–32)
CO2 SERPL-SCNC: 16 MMOL/L — LOW (ref 17–32)
CO2 SERPL-SCNC: 17 MMOL/L — SIGNIFICANT CHANGE UP (ref 17–32)
CO2 SERPL-SCNC: 19 MMOL/L — SIGNIFICANT CHANGE UP (ref 17–32)
CO2 SERPL-SCNC: 20 MMOL/L — SIGNIFICANT CHANGE UP (ref 17–32)
CO2 SERPL-SCNC: 22 MMOL/L — SIGNIFICANT CHANGE UP (ref 17–32)
CO2 SERPL-SCNC: 7 MMOL/L — CRITICAL LOW (ref 17–32)
COLOR SPEC: YELLOW — SIGNIFICANT CHANGE UP
COLOR SPEC: YELLOW — SIGNIFICANT CHANGE UP
CREAT SERPL-MCNC: 1 MG/DL — SIGNIFICANT CHANGE UP (ref 0.7–1.5)
CREAT SERPL-MCNC: 1 MG/DL — SIGNIFICANT CHANGE UP (ref 0.7–1.5)
CREAT SERPL-MCNC: 1.1 MG/DL — SIGNIFICANT CHANGE UP (ref 0.7–1.5)
CREAT SERPL-MCNC: 1.3 MG/DL — SIGNIFICANT CHANGE UP (ref 0.7–1.5)
CREAT SERPL-MCNC: 1.4 MG/DL — SIGNIFICANT CHANGE UP (ref 0.7–1.5)
CREAT SERPL-MCNC: 1.6 MG/DL — HIGH (ref 0.7–1.5)
CREAT SERPL-MCNC: 2.1 MG/DL — HIGH (ref 0.7–1.5)
CRP SERPL-MCNC: 65 MG/L — HIGH
CULTURE RESULTS: SIGNIFICANT CHANGE UP
D DIMER BLD IA.RAPID-MCNC: 608 NG/ML DDU — HIGH (ref 0–230)
DIFF PNL FLD: NEGATIVE — SIGNIFICANT CHANGE UP
DIFF PNL FLD: SIGNIFICANT CHANGE UP
EOSINOPHIL # BLD AUTO: 0 K/UL — SIGNIFICANT CHANGE UP (ref 0–0.7)
EOSINOPHIL # BLD AUTO: 0.01 K/UL — SIGNIFICANT CHANGE UP (ref 0–0.7)
EOSINOPHIL NFR BLD AUTO: 0 % — SIGNIFICANT CHANGE UP (ref 0–8)
EOSINOPHIL NFR BLD AUTO: 0.1 % — SIGNIFICANT CHANGE UP (ref 0–8)
EPI CELLS # UR: 1 /HPF — SIGNIFICANT CHANGE UP (ref 0–5)
FERRITIN SERPL-MCNC: 669 NG/ML — HIGH (ref 30–400)
GAS PNL BLDV: 128 MMOL/L — LOW (ref 136–145)
GAS PNL BLDV: SIGNIFICANT CHANGE UP
GLUCOSE BLDC GLUCOMTR-MCNC: 155 MG/DL — HIGH (ref 70–99)
GLUCOSE BLDC GLUCOMTR-MCNC: 155 MG/DL — HIGH (ref 70–99)
GLUCOSE BLDC GLUCOMTR-MCNC: 167 MG/DL — HIGH (ref 70–99)
GLUCOSE BLDC GLUCOMTR-MCNC: 174 MG/DL — HIGH (ref 70–99)
GLUCOSE BLDC GLUCOMTR-MCNC: 188 MG/DL — HIGH (ref 70–99)
GLUCOSE BLDC GLUCOMTR-MCNC: 198 MG/DL — HIGH (ref 70–99)
GLUCOSE BLDC GLUCOMTR-MCNC: 218 MG/DL — HIGH (ref 70–99)
GLUCOSE BLDC GLUCOMTR-MCNC: 225 MG/DL — HIGH (ref 70–99)
GLUCOSE BLDC GLUCOMTR-MCNC: 231 MG/DL — HIGH (ref 70–99)
GLUCOSE BLDC GLUCOMTR-MCNC: 233 MG/DL — HIGH (ref 70–99)
GLUCOSE BLDC GLUCOMTR-MCNC: 238 MG/DL — HIGH (ref 70–99)
GLUCOSE BLDC GLUCOMTR-MCNC: 262 MG/DL — HIGH (ref 70–99)
GLUCOSE BLDC GLUCOMTR-MCNC: 281 MG/DL — HIGH (ref 70–99)
GLUCOSE BLDC GLUCOMTR-MCNC: 291 MG/DL — HIGH (ref 70–99)
GLUCOSE BLDC GLUCOMTR-MCNC: 295 MG/DL — HIGH (ref 70–99)
GLUCOSE BLDC GLUCOMTR-MCNC: 306 MG/DL — HIGH (ref 70–99)
GLUCOSE BLDC GLUCOMTR-MCNC: 307 MG/DL — HIGH (ref 70–99)
GLUCOSE BLDC GLUCOMTR-MCNC: 333 MG/DL — HIGH (ref 70–99)
GLUCOSE BLDC GLUCOMTR-MCNC: 340 MG/DL — HIGH (ref 70–99)
GLUCOSE BLDC GLUCOMTR-MCNC: 367 MG/DL — HIGH (ref 70–99)
GLUCOSE BLDC GLUCOMTR-MCNC: 367 MG/DL — HIGH (ref 70–99)
GLUCOSE BLDC GLUCOMTR-MCNC: 368 MG/DL — HIGH (ref 70–99)
GLUCOSE BLDC GLUCOMTR-MCNC: 395 MG/DL — HIGH (ref 70–99)
GLUCOSE BLDC GLUCOMTR-MCNC: 399 MG/DL — HIGH (ref 70–99)
GLUCOSE BLDC GLUCOMTR-MCNC: 407 MG/DL — HIGH (ref 70–99)
GLUCOSE BLDC GLUCOMTR-MCNC: 412 MG/DL — HIGH (ref 70–99)
GLUCOSE BLDC GLUCOMTR-MCNC: 423 MG/DL — HIGH (ref 70–99)
GLUCOSE BLDC GLUCOMTR-MCNC: 425 MG/DL — HIGH (ref 70–99)
GLUCOSE BLDC GLUCOMTR-MCNC: 433 MG/DL — HIGH (ref 70–99)
GLUCOSE BLDC GLUCOMTR-MCNC: 451 MG/DL — CRITICAL HIGH (ref 70–99)
GLUCOSE SERPL-MCNC: 166 MG/DL — HIGH (ref 70–99)
GLUCOSE SERPL-MCNC: 190 MG/DL — HIGH (ref 70–99)
GLUCOSE SERPL-MCNC: 254 MG/DL — HIGH (ref 70–99)
GLUCOSE SERPL-MCNC: 296 MG/DL — HIGH (ref 70–99)
GLUCOSE SERPL-MCNC: 315 MG/DL — HIGH (ref 70–99)
GLUCOSE SERPL-MCNC: 348 MG/DL — HIGH (ref 70–99)
GLUCOSE SERPL-MCNC: 406 MG/DL — HIGH (ref 70–99)
GLUCOSE UR QL: ABNORMAL
GLUCOSE UR QL: ABNORMAL
HCO3 BLDV-SCNC: 21 MMOL/L — LOW (ref 22–29)
HCT VFR BLD CALC: 22.9 % — LOW (ref 42–52)
HCT VFR BLD CALC: 35.3 % — LOW (ref 42–52)
HCT VFR BLD CALC: 38.1 % — LOW (ref 42–52)
HCT VFR BLD CALC: 39.2 % — LOW (ref 42–52)
HCT VFR BLD CALC: 39.9 % — LOW (ref 42–52)
HCT VFR BLD CALC: 43.8 % — SIGNIFICANT CHANGE UP (ref 42–52)
HCT VFR BLDA CALC: 43 % — SIGNIFICANT CHANGE UP (ref 39–51)
HGB BLD CALC-MCNC: 14.2 G/DL — SIGNIFICANT CHANGE UP (ref 12.6–17.4)
HGB BLD-MCNC: 11.5 G/DL — LOW (ref 14–18)
HGB BLD-MCNC: 12.7 G/DL — LOW (ref 14–18)
HGB BLD-MCNC: 12.7 G/DL — LOW (ref 14–18)
HGB BLD-MCNC: 13 G/DL — LOW (ref 14–18)
HGB BLD-MCNC: 14.3 G/DL — SIGNIFICANT CHANGE UP (ref 14–18)
HGB BLD-MCNC: 7.4 G/DL — LOW (ref 14–18)
HYALINE CASTS # UR AUTO: 18 /LPF — HIGH (ref 0–7)
IMM GRANULOCYTES NFR BLD AUTO: 0.3 % — SIGNIFICANT CHANGE UP (ref 0.1–0.3)
IMM GRANULOCYTES NFR BLD AUTO: 0.4 % — HIGH (ref 0.1–0.3)
IMM GRANULOCYTES NFR BLD AUTO: 0.4 % — HIGH (ref 0.1–0.3)
IMM GRANULOCYTES NFR BLD AUTO: 0.6 % — HIGH (ref 0.1–0.3)
INR BLD: 1.65 RATIO — HIGH (ref 0.65–1.3)
KETONES UR-MCNC: NEGATIVE — SIGNIFICANT CHANGE UP
KETONES UR-MCNC: NEGATIVE — SIGNIFICANT CHANGE UP
LACTATE BLDV-MCNC: 2.3 MMOL/L — HIGH (ref 0.5–2)
LEUKOCYTE ESTERASE UR-ACNC: NEGATIVE — SIGNIFICANT CHANGE UP
LEUKOCYTE ESTERASE UR-ACNC: NEGATIVE — SIGNIFICANT CHANGE UP
LYMPHOCYTES # BLD AUTO: 0.47 K/UL — LOW (ref 1.2–3.4)
LYMPHOCYTES # BLD AUTO: 0.72 K/UL — LOW (ref 1.2–3.4)
LYMPHOCYTES # BLD AUTO: 0.73 K/UL — LOW (ref 1.2–3.4)
LYMPHOCYTES # BLD AUTO: 1.06 K/UL — LOW (ref 1.2–3.4)
LYMPHOCYTES # BLD AUTO: 15.7 % — LOW (ref 20.5–51.1)
LYMPHOCYTES # BLD AUTO: 5.1 % — LOW (ref 20.5–51.1)
LYMPHOCYTES # BLD AUTO: 5.7 % — LOW (ref 20.5–51.1)
LYMPHOCYTES # BLD AUTO: 6.2 % — LOW (ref 20.5–51.1)
MAGNESIUM SERPL-MCNC: 2.1 MG/DL — SIGNIFICANT CHANGE UP (ref 1.8–2.4)
MAGNESIUM SERPL-MCNC: 2.3 MG/DL — SIGNIFICANT CHANGE UP (ref 1.8–2.4)
MCHC RBC-ENTMCNC: 28 PG — SIGNIFICANT CHANGE UP (ref 27–31)
MCHC RBC-ENTMCNC: 28.2 PG — SIGNIFICANT CHANGE UP (ref 27–31)
MCHC RBC-ENTMCNC: 28.3 PG — SIGNIFICANT CHANGE UP (ref 27–31)
MCHC RBC-ENTMCNC: 28.4 PG — SIGNIFICANT CHANGE UP (ref 27–31)
MCHC RBC-ENTMCNC: 28.9 PG — SIGNIFICANT CHANGE UP (ref 27–31)
MCHC RBC-ENTMCNC: 29.7 PG — SIGNIFICANT CHANGE UP (ref 27–31)
MCHC RBC-ENTMCNC: 32.3 G/DL — SIGNIFICANT CHANGE UP (ref 32–37)
MCHC RBC-ENTMCNC: 32.4 G/DL — SIGNIFICANT CHANGE UP (ref 32–37)
MCHC RBC-ENTMCNC: 32.6 G/DL — SIGNIFICANT CHANGE UP (ref 32–37)
MCHC RBC-ENTMCNC: 33.3 G/DL — SIGNIFICANT CHANGE UP (ref 32–37)
MCV RBC AUTO: 85.9 FL — SIGNIFICANT CHANGE UP (ref 80–94)
MCV RBC AUTO: 86.6 FL — SIGNIFICANT CHANGE UP (ref 80–94)
MCV RBC AUTO: 86.7 FL — SIGNIFICANT CHANGE UP (ref 80–94)
MCV RBC AUTO: 86.9 FL — SIGNIFICANT CHANGE UP (ref 80–94)
MCV RBC AUTO: 87.1 FL — SIGNIFICANT CHANGE UP (ref 80–94)
MCV RBC AUTO: 92 FL — SIGNIFICANT CHANGE UP (ref 80–94)
MONOCYTES # BLD AUTO: 0.19 K/UL — SIGNIFICANT CHANGE UP (ref 0.1–0.6)
MONOCYTES # BLD AUTO: 0.23 K/UL — SIGNIFICANT CHANGE UP (ref 0.1–0.6)
MONOCYTES # BLD AUTO: 0.54 K/UL — SIGNIFICANT CHANGE UP (ref 0.1–0.6)
MONOCYTES # BLD AUTO: 0.98 K/UL — HIGH (ref 0.1–0.6)
MONOCYTES NFR BLD AUTO: 2.1 % — SIGNIFICANT CHANGE UP (ref 1.7–9.3)
MONOCYTES NFR BLD AUTO: 3.4 % — SIGNIFICANT CHANGE UP (ref 1.7–9.3)
MONOCYTES NFR BLD AUTO: 4.3 % — SIGNIFICANT CHANGE UP (ref 1.7–9.3)
MONOCYTES NFR BLD AUTO: 8.3 % — SIGNIFICANT CHANGE UP (ref 1.7–9.3)
NEUTROPHILS # BLD AUTO: 10.02 K/UL — HIGH (ref 1.4–6.5)
NEUTROPHILS # BLD AUTO: 11.24 K/UL — HIGH (ref 1.4–6.5)
NEUTROPHILS # BLD AUTO: 5.4 K/UL — SIGNIFICANT CHANGE UP (ref 1.4–6.5)
NEUTROPHILS # BLD AUTO: 8.45 K/UL — HIGH (ref 1.4–6.5)
NEUTROPHILS NFR BLD AUTO: 80.2 % — HIGH (ref 42.2–75.2)
NEUTROPHILS NFR BLD AUTO: 84.9 % — HIGH (ref 42.2–75.2)
NEUTROPHILS NFR BLD AUTO: 89.3 % — HIGH (ref 42.2–75.2)
NEUTROPHILS NFR BLD AUTO: 92.3 % — HIGH (ref 42.2–75.2)
NITRITE UR-MCNC: NEGATIVE — SIGNIFICANT CHANGE UP
NITRITE UR-MCNC: NEGATIVE — SIGNIFICANT CHANGE UP
NRBC # BLD: 0 /100 WBCS — SIGNIFICANT CHANGE UP (ref 0–0)
NT-PROBNP SERPL-SCNC: 598 PG/ML — HIGH (ref 0–300)
NT-PROBNP SERPL-SCNC: 866 PG/ML — HIGH (ref 0–300)
OB PNL STL: POSITIVE
OSMOLALITY UR: 497 MOS/KG — SIGNIFICANT CHANGE UP (ref 50–1200)
PCO2 BLDV: 37 MMHG — LOW (ref 42–55)
PH BLDV: 7.37 — SIGNIFICANT CHANGE UP (ref 7.32–7.43)
PH UR: 5.5 — SIGNIFICANT CHANGE UP (ref 5–8)
PH UR: 6 — SIGNIFICANT CHANGE UP (ref 5–8)
PLATELET # BLD AUTO: 163 K/UL — SIGNIFICANT CHANGE UP (ref 130–400)
PLATELET # BLD AUTO: 226 K/UL — SIGNIFICANT CHANGE UP (ref 130–400)
PLATELET # BLD AUTO: 231 K/UL — SIGNIFICANT CHANGE UP (ref 130–400)
PLATELET # BLD AUTO: 265 K/UL — SIGNIFICANT CHANGE UP (ref 130–400)
PLATELET # BLD AUTO: 290 K/UL — SIGNIFICANT CHANGE UP (ref 130–400)
PLATELET # BLD AUTO: 450 K/UL — HIGH (ref 130–400)
PO2 BLDV: 49 MMHG — SIGNIFICANT CHANGE UP
POTASSIUM BLDV-SCNC: 4.6 MMOL/L — SIGNIFICANT CHANGE UP (ref 3.5–5.1)
POTASSIUM SERPL-MCNC: 4.4 MMOL/L — SIGNIFICANT CHANGE UP (ref 3.5–5)
POTASSIUM SERPL-MCNC: 4.9 MMOL/L — SIGNIFICANT CHANGE UP (ref 3.5–5)
POTASSIUM SERPL-MCNC: 5 MMOL/L — SIGNIFICANT CHANGE UP (ref 3.5–5)
POTASSIUM SERPL-MCNC: 5 MMOL/L — SIGNIFICANT CHANGE UP (ref 3.5–5)
POTASSIUM SERPL-MCNC: 5.3 MMOL/L — HIGH (ref 3.5–5)
POTASSIUM SERPL-MCNC: 5.5 MMOL/L — HIGH (ref 3.5–5)
POTASSIUM SERPL-MCNC: 5.6 MMOL/L — HIGH (ref 3.5–5)
POTASSIUM SERPL-SCNC: 4.4 MMOL/L — SIGNIFICANT CHANGE UP (ref 3.5–5)
POTASSIUM SERPL-SCNC: 4.9 MMOL/L — SIGNIFICANT CHANGE UP (ref 3.5–5)
POTASSIUM SERPL-SCNC: 5 MMOL/L — SIGNIFICANT CHANGE UP (ref 3.5–5)
POTASSIUM SERPL-SCNC: 5 MMOL/L — SIGNIFICANT CHANGE UP (ref 3.5–5)
POTASSIUM SERPL-SCNC: 5.3 MMOL/L — HIGH (ref 3.5–5)
POTASSIUM SERPL-SCNC: 5.5 MMOL/L — HIGH (ref 3.5–5)
POTASSIUM SERPL-SCNC: 5.6 MMOL/L — HIGH (ref 3.5–5)
PROCALCITONIN SERPL-MCNC: 0.23 NG/ML — HIGH (ref 0.02–0.1)
PROT SERPL-MCNC: 6.8 G/DL — SIGNIFICANT CHANGE UP (ref 6–8)
PROT SERPL-MCNC: 6.9 G/DL — SIGNIFICANT CHANGE UP (ref 6–8)
PROT SERPL-MCNC: 7.5 G/DL — SIGNIFICANT CHANGE UP (ref 6–8)
PROT SERPL-MCNC: 7.7 G/DL — SIGNIFICANT CHANGE UP (ref 6–8)
PROT UR-MCNC: ABNORMAL
PROT UR-MCNC: ABNORMAL
PROTHROM AB SERPL-ACNC: 18.9 SEC — HIGH (ref 9.95–12.87)
RBC # BLD: 2.49 M/UL — LOW (ref 4.7–6.1)
RBC # BLD: 4.07 M/UL — LOW (ref 4.7–6.1)
RBC # BLD: 4.4 M/UL — LOW (ref 4.7–6.1)
RBC # BLD: 4.51 M/UL — LOW (ref 4.7–6.1)
RBC # BLD: 4.58 M/UL — LOW (ref 4.7–6.1)
RBC # BLD: 5.1 M/UL — SIGNIFICANT CHANGE UP (ref 4.7–6.1)
RBC # FLD: 14.4 % — SIGNIFICANT CHANGE UP (ref 11.5–14.5)
RBC # FLD: 14.6 % — HIGH (ref 11.5–14.5)
RBC # FLD: 14.6 % — HIGH (ref 11.5–14.5)
RBC # FLD: 14.7 % — HIGH (ref 11.5–14.5)
RBC CASTS # UR COMP ASSIST: 4 /HPF — SIGNIFICANT CHANGE UP (ref 0–4)
SAO2 % BLDV: 78.9 % — SIGNIFICANT CHANGE UP
SARS-COV-2 RNA SPEC QL NAA+PROBE: DETECTED
SARS-COV-2 RNA SPEC QL NAA+PROBE: SIGNIFICANT CHANGE UP
SODIUM SERPL-SCNC: 130 MMOL/L — LOW (ref 135–146)
SODIUM SERPL-SCNC: 130 MMOL/L — LOW (ref 135–146)
SODIUM SERPL-SCNC: 132 MMOL/L — LOW (ref 135–146)
SODIUM SERPL-SCNC: 133 MMOL/L — LOW (ref 135–146)
SODIUM SERPL-SCNC: 136 MMOL/L — SIGNIFICANT CHANGE UP (ref 135–146)
SODIUM SERPL-SCNC: 136 MMOL/L — SIGNIFICANT CHANGE UP (ref 135–146)
SODIUM SERPL-SCNC: 138 MMOL/L — SIGNIFICANT CHANGE UP (ref 135–146)
SP GR SPEC: 1.02 — SIGNIFICANT CHANGE UP (ref 1.01–1.03)
SP GR SPEC: 1.03 — HIGH (ref 1.01–1.03)
SPECIMEN SOURCE: SIGNIFICANT CHANGE UP
TROPONIN T SERPL-MCNC: 0.03 NG/ML — CRITICAL HIGH
TROPONIN T SERPL-MCNC: 0.03 NG/ML — CRITICAL HIGH
TROPONIN T SERPL-MCNC: <0.01 NG/ML — SIGNIFICANT CHANGE UP
TROPONIN T SERPL-MCNC: <0.01 NG/ML — SIGNIFICANT CHANGE UP
UROBILINOGEN FLD QL: SIGNIFICANT CHANGE UP
UROBILINOGEN FLD QL: SIGNIFICANT CHANGE UP
WBC # BLD: 11.8 K/UL — HIGH (ref 4.8–10.8)
WBC # BLD: 12.58 K/UL — HIGH (ref 4.8–10.8)
WBC # BLD: 26.76 K/UL — HIGH (ref 4.8–10.8)
WBC # BLD: 6.74 K/UL — SIGNIFICANT CHANGE UP (ref 4.8–10.8)
WBC # BLD: 8.97 K/UL — SIGNIFICANT CHANGE UP (ref 4.8–10.8)
WBC # BLD: 9.16 K/UL — SIGNIFICANT CHANGE UP (ref 4.8–10.8)
WBC # FLD AUTO: 11.8 K/UL — HIGH (ref 4.8–10.8)
WBC # FLD AUTO: 12.58 K/UL — HIGH (ref 4.8–10.8)
WBC # FLD AUTO: 26.76 K/UL — HIGH (ref 4.8–10.8)
WBC # FLD AUTO: 6.74 K/UL — SIGNIFICANT CHANGE UP (ref 4.8–10.8)
WBC # FLD AUTO: 8.97 K/UL — SIGNIFICANT CHANGE UP (ref 4.8–10.8)
WBC # FLD AUTO: 9.16 K/UL — SIGNIFICANT CHANGE UP (ref 4.8–10.8)
WBC UR QL: 2 /HPF — SIGNIFICANT CHANGE UP (ref 0–5)

## 2021-01-01 PROCEDURE — 93010 ELECTROCARDIOGRAM REPORT: CPT

## 2021-01-01 PROCEDURE — 71275 CT ANGIOGRAPHY CHEST: CPT | Mod: 26,MA

## 2021-01-01 PROCEDURE — 99232 SBSQ HOSP IP/OBS MODERATE 35: CPT

## 2021-01-01 PROCEDURE — 71045 X-RAY EXAM CHEST 1 VIEW: CPT | Mod: 26

## 2021-01-01 PROCEDURE — 99223 1ST HOSP IP/OBS HIGH 75: CPT

## 2021-01-01 PROCEDURE — 99233 SBSQ HOSP IP/OBS HIGH 50: CPT

## 2021-01-01 PROCEDURE — 93000 ELECTROCARDIOGRAM COMPLETE: CPT

## 2021-01-01 PROCEDURE — 99214 OFFICE O/P EST MOD 30 MIN: CPT

## 2021-01-01 PROCEDURE — 99285 EMERGENCY DEPT VISIT HI MDM: CPT

## 2021-01-01 PROCEDURE — 99072 ADDL SUPL MATRL&STAF TM PHE: CPT

## 2021-01-01 RX ORDER — OMEPRAZOLE 10 MG/1
1 CAPSULE, DELAYED RELEASE ORAL
Qty: 0 | Refills: 0 | DISCHARGE

## 2021-01-01 RX ORDER — ASPIRIN/CALCIUM CARB/MAGNESIUM 324 MG
81 TABLET ORAL DAILY
Refills: 0 | Status: DISCONTINUED | OUTPATIENT
Start: 2021-01-01 | End: 2021-01-01

## 2021-01-01 RX ORDER — METOPROLOL TARTRATE 50 MG
1 TABLET ORAL
Qty: 0 | Refills: 0 | DISCHARGE
Start: 2021-01-01

## 2021-01-01 RX ORDER — METOPROLOL TARTRATE 50 MG
1 TABLET ORAL
Qty: 0 | Refills: 0 | DISCHARGE

## 2021-01-01 RX ORDER — GLUCAGON INJECTION, SOLUTION 0.5 MG/.1ML
1 INJECTION, SOLUTION SUBCUTANEOUS ONCE
Refills: 0 | Status: DISCONTINUED | OUTPATIENT
Start: 2021-01-01 | End: 2021-01-01

## 2021-01-01 RX ORDER — CEFTRIAXONE 500 MG/1
1000 INJECTION, POWDER, FOR SOLUTION INTRAMUSCULAR; INTRAVENOUS ONCE
Refills: 0 | Status: COMPLETED | OUTPATIENT
Start: 2021-01-01 | End: 2021-01-01

## 2021-01-01 RX ORDER — DEXTROSE 50 % IN WATER 50 %
25 SYRINGE (ML) INTRAVENOUS ONCE
Refills: 0 | Status: DISCONTINUED | OUTPATIENT
Start: 2021-01-01 | End: 2021-01-01

## 2021-01-01 RX ORDER — INSULIN LISPRO 100/ML
VIAL (ML) SUBCUTANEOUS
Refills: 0 | Status: DISCONTINUED | OUTPATIENT
Start: 2021-01-01 | End: 2021-01-01

## 2021-01-01 RX ORDER — GABAPENTIN 400 MG/1
300 CAPSULE ORAL AT BEDTIME
Refills: 0 | Status: DISCONTINUED | OUTPATIENT
Start: 2021-01-01 | End: 2021-01-01

## 2021-01-01 RX ORDER — SODIUM CHLORIDE 9 MG/ML
250 INJECTION INTRAMUSCULAR; INTRAVENOUS; SUBCUTANEOUS
Refills: 0 | Status: COMPLETED | OUTPATIENT
Start: 2021-01-01 | End: 2021-01-01

## 2021-01-01 RX ORDER — ATORVASTATIN CALCIUM 80 MG/1
40 TABLET, FILM COATED ORAL DAILY
Refills: 0 | Status: DISCONTINUED | OUTPATIENT
Start: 2021-01-01 | End: 2021-01-01

## 2021-01-01 RX ORDER — SODIUM CHLORIDE 9 MG/ML
1000 INJECTION INTRAMUSCULAR; INTRAVENOUS; SUBCUTANEOUS
Refills: 0 | Status: DISCONTINUED | OUTPATIENT
Start: 2021-01-01 | End: 2021-01-01

## 2021-01-01 RX ORDER — HEPARIN SODIUM 5000 [USP'U]/ML
3500 INJECTION INTRAVENOUS; SUBCUTANEOUS EVERY 6 HOURS
Refills: 0 | Status: DISCONTINUED | OUTPATIENT
Start: 2021-01-01 | End: 2021-01-01

## 2021-01-01 RX ORDER — HEPARIN SODIUM 5000 [USP'U]/ML
INJECTION INTRAVENOUS; SUBCUTANEOUS
Qty: 25000 | Refills: 0 | Status: DISCONTINUED | OUTPATIENT
Start: 2021-01-01 | End: 2021-01-01

## 2021-01-01 RX ORDER — INSULIN LISPRO 100/ML
10 VIAL (ML) SUBCUTANEOUS ONCE
Refills: 0 | Status: COMPLETED | OUTPATIENT
Start: 2021-01-01 | End: 2021-01-01

## 2021-01-01 RX ORDER — INSULIN HUMAN 100 [IU]/ML
10 INJECTION, SOLUTION SUBCUTANEOUS ONCE
Refills: 0 | Status: COMPLETED | OUTPATIENT
Start: 2021-01-01 | End: 2021-01-01

## 2021-01-01 RX ORDER — IBUPROFEN 200 MG
1 TABLET ORAL
Qty: 20 | Refills: 0
Start: 2021-01-01

## 2021-01-01 RX ORDER — METFORMIN HYDROCHLORIDE 850 MG/1
1 TABLET ORAL
Qty: 0 | Refills: 0 | DISCHARGE
Start: 2021-01-01

## 2021-01-01 RX ORDER — SODIUM CHLORIDE 9 MG/ML
500 INJECTION INTRAMUSCULAR; INTRAVENOUS; SUBCUTANEOUS ONCE
Refills: 0 | Status: COMPLETED | OUTPATIENT
Start: 2021-01-01 | End: 2021-01-01

## 2021-01-01 RX ORDER — RIVAROXABAN 15 MG-20MG
15 KIT ORAL
Refills: 0 | Status: DISCONTINUED | OUTPATIENT
Start: 2021-01-01 | End: 2021-01-01

## 2021-01-01 RX ORDER — METOPROLOL TARTRATE 50 MG
50 TABLET ORAL DAILY
Refills: 0 | Status: DISCONTINUED | OUTPATIENT
Start: 2021-01-01 | End: 2021-01-01

## 2021-01-01 RX ORDER — RIVAROXABAN 15 MG-20MG
1 KIT ORAL
Qty: 1 | Refills: 0
Start: 2021-01-01

## 2021-01-01 RX ORDER — PANTOPRAZOLE SODIUM 20 MG/1
40 TABLET, DELAYED RELEASE ORAL
Refills: 0 | Status: DISCONTINUED | OUTPATIENT
Start: 2021-01-01 | End: 2021-01-01

## 2021-01-01 RX ORDER — SODIUM CHLORIDE 9 MG/ML
1000 INJECTION, SOLUTION INTRAVENOUS
Refills: 0 | Status: DISCONTINUED | OUTPATIENT
Start: 2021-01-01 | End: 2021-01-01

## 2021-01-01 RX ORDER — CHLORHEXIDINE GLUCONATE 213 G/1000ML
1 SOLUTION TOPICAL
Refills: 0 | Status: DISCONTINUED | OUTPATIENT
Start: 2021-01-01 | End: 2021-01-01

## 2021-01-01 RX ORDER — HEPARIN SODIUM 5000 [USP'U]/ML
7500 INJECTION INTRAVENOUS; SUBCUTANEOUS EVERY 6 HOURS
Refills: 0 | Status: DISCONTINUED | OUTPATIENT
Start: 2021-01-01 | End: 2021-01-01

## 2021-01-01 RX ORDER — GABAPENTIN 400 MG/1
0 CAPSULE ORAL
Qty: 0 | Refills: 0 | DISCHARGE

## 2021-01-01 RX ORDER — ACETAMINOPHEN 500 MG
650 TABLET ORAL ONCE
Refills: 0 | Status: COMPLETED | OUTPATIENT
Start: 2021-01-01 | End: 2021-01-01

## 2021-01-01 RX ORDER — INSULIN LISPRO 100 [IU]/ML
60 INJECTION, SUSPENSION SUBCUTANEOUS
Qty: 0 | Refills: 0 | DISCHARGE

## 2021-01-01 RX ORDER — INSULIN LISPRO 100 [IU]/ML
0 INJECTION, SUSPENSION SUBCUTANEOUS
Qty: 0 | Refills: 0 | DISCHARGE
Start: 2021-01-01

## 2021-01-01 RX ORDER — INSULIN GLARGINE 100 [IU]/ML
25 INJECTION, SOLUTION SUBCUTANEOUS AT BEDTIME
Refills: 0 | Status: DISCONTINUED | OUTPATIENT
Start: 2021-01-01 | End: 2021-01-01

## 2021-01-01 RX ORDER — ATORVASTATIN CALCIUM 80 MG/1
1 TABLET, FILM COATED ORAL
Qty: 0 | Refills: 0 | DISCHARGE

## 2021-01-01 RX ORDER — ATORVASTATIN CALCIUM 80 MG/1
1 TABLET, FILM COATED ORAL
Qty: 0 | Refills: 0 | DISCHARGE
Start: 2021-01-01

## 2021-01-01 RX ORDER — ASPIRIN/CALCIUM CARB/MAGNESIUM 324 MG
1 TABLET ORAL
Qty: 0 | Refills: 0 | DISCHARGE
Start: 2021-01-01

## 2021-01-01 RX ORDER — DEXAMETHASONE 0.5 MG/5ML
6 ELIXIR ORAL DAILY
Refills: 0 | Status: DISCONTINUED | OUTPATIENT
Start: 2021-01-01 | End: 2021-01-01

## 2021-01-01 RX ORDER — ENOXAPARIN SODIUM 100 MG/ML
90 INJECTION SUBCUTANEOUS
Refills: 0 | Status: DISCONTINUED | OUTPATIENT
Start: 2021-01-01 | End: 2021-01-01

## 2021-01-01 RX ORDER — INSULIN LISPRO 100/ML
6 VIAL (ML) SUBCUTANEOUS ONCE
Refills: 0 | Status: COMPLETED | OUTPATIENT
Start: 2021-01-01 | End: 2021-01-01

## 2021-01-01 RX ORDER — INSULIN GLARGINE 100 [IU]/ML
35 INJECTION, SOLUTION SUBCUTANEOUS AT BEDTIME
Refills: 0 | Status: DISCONTINUED | OUTPATIENT
Start: 2021-01-01 | End: 2021-01-01

## 2021-01-01 RX ORDER — OMEPRAZOLE 10 MG/1
1 CAPSULE, DELAYED RELEASE ORAL
Qty: 0 | Refills: 0 | DISCHARGE
Start: 2021-01-01

## 2021-01-01 RX ORDER — INSULIN LISPRO 100/ML
8 VIAL (ML) SUBCUTANEOUS
Refills: 0 | Status: DISCONTINUED | OUTPATIENT
Start: 2021-01-01 | End: 2021-01-01

## 2021-01-01 RX ORDER — METFORMIN HYDROCHLORIDE 850 MG/1
1 TABLET ORAL
Qty: 0 | Refills: 0 | DISCHARGE

## 2021-01-01 RX ORDER — GABAPENTIN 400 MG/1
1 CAPSULE ORAL
Qty: 0 | Refills: 0 | DISCHARGE
Start: 2021-01-01

## 2021-01-01 RX ORDER — DEXTROSE 50 % IN WATER 50 %
15 SYRINGE (ML) INTRAVENOUS ONCE
Refills: 0 | Status: DISCONTINUED | OUTPATIENT
Start: 2021-01-01 | End: 2021-01-01

## 2021-01-01 RX ORDER — DEXTROSE 50 % IN WATER 50 %
12.5 SYRINGE (ML) INTRAVENOUS ONCE
Refills: 0 | Status: DISCONTINUED | OUTPATIENT
Start: 2021-01-01 | End: 2021-01-01

## 2021-01-01 RX ORDER — ASPIRIN/CALCIUM CARB/MAGNESIUM 324 MG
1 TABLET ORAL
Qty: 0 | Refills: 0 | DISCHARGE

## 2021-01-01 RX ORDER — INSULIN LISPRO 100/ML
11 VIAL (ML) SUBCUTANEOUS
Refills: 0 | Status: DISCONTINUED | OUTPATIENT
Start: 2021-01-01 | End: 2021-01-01

## 2021-01-01 RX ORDER — AZITHROMYCIN 500 MG/1
500 TABLET, FILM COATED ORAL ONCE
Refills: 0 | Status: COMPLETED | OUTPATIENT
Start: 2021-01-01 | End: 2021-01-01

## 2021-01-01 RX ADMIN — Medication 6 UNIT(S): at 13:54

## 2021-01-01 RX ADMIN — Medication 1: at 09:05

## 2021-01-01 RX ADMIN — Medication 40 MILLIGRAM(S): at 05:42

## 2021-01-01 RX ADMIN — Medication 3: at 11:11

## 2021-01-01 RX ADMIN — GABAPENTIN 300 MILLIGRAM(S): 400 CAPSULE ORAL at 23:21

## 2021-01-01 RX ADMIN — Medication 2: at 12:59

## 2021-01-01 RX ADMIN — Medication 50 MILLIGRAM(S): at 05:42

## 2021-01-01 RX ADMIN — Medication 50 MILLIGRAM(S): at 05:48

## 2021-01-01 RX ADMIN — Medication 8 UNIT(S): at 11:40

## 2021-01-01 RX ADMIN — INSULIN GLARGINE 35 UNIT(S): 100 INJECTION, SOLUTION SUBCUTANEOUS at 21:25

## 2021-01-01 RX ADMIN — Medication 11 UNIT(S): at 17:39

## 2021-01-01 RX ADMIN — PANTOPRAZOLE SODIUM 40 MILLIGRAM(S): 20 TABLET, DELAYED RELEASE ORAL at 05:42

## 2021-01-01 RX ADMIN — Medication 11 UNIT(S): at 08:12

## 2021-01-01 RX ADMIN — RIVAROXABAN 15 MILLIGRAM(S): KIT at 17:41

## 2021-01-01 RX ADMIN — PANTOPRAZOLE SODIUM 40 MILLIGRAM(S): 20 TABLET, DELAYED RELEASE ORAL at 07:59

## 2021-01-01 RX ADMIN — Medication 6 MILLIGRAM(S): at 05:20

## 2021-01-01 RX ADMIN — INSULIN GLARGINE 35 UNIT(S): 100 INJECTION, SOLUTION SUBCUTANEOUS at 21:58

## 2021-01-01 RX ADMIN — Medication 8 UNIT(S): at 09:07

## 2021-01-01 RX ADMIN — ATORVASTATIN CALCIUM 40 MILLIGRAM(S): 80 TABLET, FILM COATED ORAL at 11:10

## 2021-01-01 RX ADMIN — ATORVASTATIN CALCIUM 40 MILLIGRAM(S): 80 TABLET, FILM COATED ORAL at 11:29

## 2021-01-01 RX ADMIN — Medication 11 UNIT(S): at 07:48

## 2021-01-01 RX ADMIN — Medication 1: at 18:32

## 2021-01-01 RX ADMIN — Medication 5: at 11:31

## 2021-01-01 RX ADMIN — Medication 8 UNIT(S): at 18:32

## 2021-01-01 RX ADMIN — ENOXAPARIN SODIUM 90 MILLIGRAM(S): 100 INJECTION SUBCUTANEOUS at 06:03

## 2021-01-01 RX ADMIN — PANTOPRAZOLE SODIUM 40 MILLIGRAM(S): 20 TABLET, DELAYED RELEASE ORAL at 06:15

## 2021-01-01 RX ADMIN — CHLORHEXIDINE GLUCONATE 1 APPLICATION(S): 213 SOLUTION TOPICAL at 05:48

## 2021-01-01 RX ADMIN — Medication 81 MILLIGRAM(S): at 11:30

## 2021-01-01 RX ADMIN — Medication 81 MILLIGRAM(S): at 11:29

## 2021-01-01 RX ADMIN — INSULIN HUMAN 10 UNIT(S): 100 INJECTION, SOLUTION SUBCUTANEOUS at 14:11

## 2021-01-01 RX ADMIN — ATORVASTATIN CALCIUM 40 MILLIGRAM(S): 80 TABLET, FILM COATED ORAL at 11:41

## 2021-01-01 RX ADMIN — RIVAROXABAN 15 MILLIGRAM(S): KIT at 17:18

## 2021-01-01 RX ADMIN — Medication 1: at 09:34

## 2021-01-01 RX ADMIN — Medication 8 UNIT(S): at 17:08

## 2021-01-01 RX ADMIN — Medication 2: at 08:11

## 2021-01-01 RX ADMIN — SODIUM CHLORIDE 500 MILLILITER(S): 9 INJECTION INTRAMUSCULAR; INTRAVENOUS; SUBCUTANEOUS at 15:25

## 2021-01-01 RX ADMIN — Medication 6 MILLIGRAM(S): at 06:03

## 2021-01-01 RX ADMIN — Medication 650 MILLIGRAM(S): at 13:35

## 2021-01-01 RX ADMIN — ENOXAPARIN SODIUM 90 MILLIGRAM(S): 100 INJECTION SUBCUTANEOUS at 19:35

## 2021-01-01 RX ADMIN — RIVAROXABAN 15 MILLIGRAM(S): KIT at 17:22

## 2021-01-01 RX ADMIN — Medication 81 MILLIGRAM(S): at 11:46

## 2021-01-01 RX ADMIN — Medication 8 UNIT(S): at 12:59

## 2021-01-01 RX ADMIN — Medication 50 MILLIGRAM(S): at 05:18

## 2021-01-01 RX ADMIN — INSULIN GLARGINE 35 UNIT(S): 100 INJECTION, SOLUTION SUBCUTANEOUS at 21:07

## 2021-01-01 RX ADMIN — ATORVASTATIN CALCIUM 40 MILLIGRAM(S): 80 TABLET, FILM COATED ORAL at 21:58

## 2021-01-01 RX ADMIN — INSULIN GLARGINE 25 UNIT(S): 100 INJECTION, SOLUTION SUBCUTANEOUS at 22:01

## 2021-01-01 RX ADMIN — Medication 8 UNIT(S): at 11:12

## 2021-01-01 RX ADMIN — Medication 4: at 17:08

## 2021-01-01 RX ADMIN — Medication 6 MILLIGRAM(S): at 05:48

## 2021-01-01 RX ADMIN — CHLORHEXIDINE GLUCONATE 1 APPLICATION(S): 213 SOLUTION TOPICAL at 05:56

## 2021-01-01 RX ADMIN — INSULIN GLARGINE 25 UNIT(S): 100 INJECTION, SOLUTION SUBCUTANEOUS at 21:31

## 2021-01-01 RX ADMIN — SODIUM CHLORIDE 500 MILLILITER(S): 9 INJECTION INTRAMUSCULAR; INTRAVENOUS; SUBCUTANEOUS at 15:13

## 2021-01-01 RX ADMIN — INSULIN HUMAN 10 UNIT(S): 100 INJECTION, SOLUTION SUBCUTANEOUS at 11:43

## 2021-01-01 RX ADMIN — ENOXAPARIN SODIUM 90 MILLIGRAM(S): 100 INJECTION SUBCUTANEOUS at 17:52

## 2021-01-01 RX ADMIN — Medication 6 MILLIGRAM(S): at 06:15

## 2021-01-01 RX ADMIN — CEFTRIAXONE 1000 MILLIGRAM(S): 500 INJECTION, POWDER, FOR SOLUTION INTRAMUSCULAR; INTRAVENOUS at 13:22

## 2021-01-01 RX ADMIN — Medication 6: at 07:48

## 2021-01-01 RX ADMIN — ATORVASTATIN CALCIUM 40 MILLIGRAM(S): 80 TABLET, FILM COATED ORAL at 11:30

## 2021-01-01 RX ADMIN — CHLORHEXIDINE GLUCONATE 1 APPLICATION(S): 213 SOLUTION TOPICAL at 05:43

## 2021-01-01 RX ADMIN — SODIUM CHLORIDE 310 MILLILITER(S): 9 INJECTION INTRAMUSCULAR; INTRAVENOUS; SUBCUTANEOUS at 15:06

## 2021-01-01 RX ADMIN — CEFTRIAXONE 100 MILLIGRAM(S): 500 INJECTION, POWDER, FOR SOLUTION INTRAMUSCULAR; INTRAVENOUS at 12:58

## 2021-01-01 RX ADMIN — Medication 50 MILLIGRAM(S): at 06:03

## 2021-01-01 RX ADMIN — Medication 11 UNIT(S): at 17:20

## 2021-01-01 RX ADMIN — Medication 6: at 07:59

## 2021-01-01 RX ADMIN — Medication 6: at 11:40

## 2021-01-01 RX ADMIN — CHLORHEXIDINE GLUCONATE 1 APPLICATION(S): 213 SOLUTION TOPICAL at 06:16

## 2021-01-01 RX ADMIN — Medication 11 UNIT(S): at 17:23

## 2021-01-01 RX ADMIN — Medication 3: at 17:21

## 2021-01-01 RX ADMIN — Medication 2: at 07:59

## 2021-01-01 RX ADMIN — Medication 11 UNIT(S): at 11:44

## 2021-01-01 RX ADMIN — Medication 8 UNIT(S): at 07:58

## 2021-01-01 RX ADMIN — Medication 10 UNIT(S): at 06:53

## 2021-01-01 RX ADMIN — RIVAROXABAN 15 MILLIGRAM(S): KIT at 07:59

## 2021-01-01 RX ADMIN — GABAPENTIN 300 MILLIGRAM(S): 400 CAPSULE ORAL at 21:24

## 2021-01-01 RX ADMIN — Medication 11 UNIT(S): at 07:59

## 2021-01-01 RX ADMIN — Medication 10 UNIT(S): at 09:20

## 2021-01-01 RX ADMIN — SODIUM CHLORIDE 500 MILLILITER(S): 9 INJECTION INTRAMUSCULAR; INTRAVENOUS; SUBCUTANEOUS at 11:36

## 2021-01-01 RX ADMIN — PANTOPRAZOLE SODIUM 40 MILLIGRAM(S): 20 TABLET, DELAYED RELEASE ORAL at 05:48

## 2021-01-01 RX ADMIN — Medication 8 UNIT(S): at 09:34

## 2021-01-01 RX ADMIN — Medication 81 MILLIGRAM(S): at 11:41

## 2021-01-01 RX ADMIN — INSULIN GLARGINE 25 UNIT(S): 100 INJECTION, SOLUTION SUBCUTANEOUS at 23:21

## 2021-01-01 RX ADMIN — GABAPENTIN 300 MILLIGRAM(S): 400 CAPSULE ORAL at 21:32

## 2021-01-01 RX ADMIN — Medication 4: at 17:23

## 2021-01-01 RX ADMIN — Medication 81 MILLIGRAM(S): at 11:09

## 2021-01-01 RX ADMIN — Medication 5: at 17:39

## 2021-01-01 RX ADMIN — GABAPENTIN 300 MILLIGRAM(S): 400 CAPSULE ORAL at 23:13

## 2021-01-01 RX ADMIN — AZITHROMYCIN 255 MILLIGRAM(S): 500 TABLET, FILM COATED ORAL at 12:57

## 2021-01-01 RX ADMIN — INSULIN HUMAN 10 UNIT(S): 100 INJECTION, SOLUTION SUBCUTANEOUS at 03:10

## 2021-01-01 RX ADMIN — Medication 11 UNIT(S): at 11:31

## 2021-01-01 RX ADMIN — Medication 6 MILLIGRAM(S): at 08:56

## 2021-01-01 RX ADMIN — GABAPENTIN 300 MILLIGRAM(S): 400 CAPSULE ORAL at 21:07

## 2021-01-01 RX ADMIN — GABAPENTIN 300 MILLIGRAM(S): 400 CAPSULE ORAL at 21:58

## 2021-01-01 RX ADMIN — RIVAROXABAN 15 MILLIGRAM(S): KIT at 08:11

## 2021-01-01 RX ADMIN — Medication 6: at 11:44

## 2021-01-01 RX ADMIN — ENOXAPARIN SODIUM 90 MILLIGRAM(S): 100 INJECTION SUBCUTANEOUS at 05:20

## 2021-01-01 RX ADMIN — Medication 50 MILLIGRAM(S): at 06:15

## 2021-01-27 NOTE — PHYSICAL EXAM
[General Appearance - Well Developed] : well developed [Normal Appearance] : normal appearance [Well Groomed] : well groomed [General Appearance - Well Nourished] : well nourished [No Deformities] : no deformities [General Appearance - In No Acute Distress] : no acute distress [Normal Conjunctiva] : the conjunctiva exhibited no abnormalities [Eyelids - No Xanthelasma] : the eyelids demonstrated no xanthelasmas [Normal Oral Mucosa] : normal oral mucosa [No Oral Pallor] : no oral pallor [No Oral Cyanosis] : no oral cyanosis [Normal Jugular Venous A Waves Present] : normal jugular venous A waves present [Normal Jugular Venous V Waves Present] : normal jugular venous V waves present [No Jugular Venous Bran A Waves] : no jugular venous bran A waves [Heart Rate And Rhythm] : heart rate and rhythm were normal [Heart Sounds] : normal S1 and S2 [Murmurs] : no murmurs present [Respiration, Rhythm And Depth] : normal respiratory rhythm and effort [Exaggerated Use Of Accessory Muscles For Inspiration] : no accessory muscle use [Auscultation Breath Sounds / Voice Sounds] : lungs were clear to auscultation bilaterally [Bowel Sounds] : normal bowel sounds [Abdomen Soft] : soft [Abdomen Tenderness] : non-tender [Abdomen Mass (___ Cm)] : no abdominal mass palpated [Abnormal Walk] : normal gait [Nail Clubbing] : no clubbing of the fingernails [Cyanosis, Localized] : no localized cyanosis [Petechial Hemorrhages (___cm)] : no petechial hemorrhages [Skin Color & Pigmentation] : normal skin color and pigmentation [] : no rash [No Venous Stasis] : no venous stasis [Skin Lesions] : no skin lesions [No Skin Ulcers] : no skin ulcer [No Xanthoma] : no  xanthoma was observed [Oriented To Time, Place, And Person] : oriented to person, place, and time

## 2021-01-27 NOTE — HISTORY OF PRESENT ILLNESS
[FreeTextEntry1] : 80 y.o. 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 No chest pain or shortness of breath. History of hypertension, hyperlipidemia and diabetes . History of back surgery. Back and neck pain has been stable. Can walk > 4 mets without chest pain. Had right knee arthroscopy since last visit. Lost 3lbs.

## 2021-03-23 NOTE — H&P PST ADULT - NSANTHTOTALSCORECAL_ENT_A_CORE
Fax received from [facility] regarding Pt  Pt location at SNF: [address]  Fax sent by:     Fax regarding concern: update    Assessment:         Any recommendations or new orders?       5

## 2021-07-29 PROBLEM — N18.30 CKD (CHRONIC KIDNEY DISEASE), STAGE III: Status: ACTIVE | Noted: 2020-01-01

## 2021-07-29 NOTE — HISTORY OF PRESENT ILLNESS
[FreeTextEntry1] : 81. y. o. 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 No chest pain or shortness of breath. History of hypertension, hyperlipidemia and diabetes . History of back surgery. Back and neck pain has been stable. Can walk > 4 mets without chest pain.  Lost 5lbs.

## 2021-08-25 NOTE — BRIEF OPERATIVE NOTE - PRIMARY SURGEON
Post-Op Assessment Note      CV Status:  Stable    Mental Status:  Alert and awake    Hydration Status:  Euvolemic    PONV Controlled:  Controlled    Airway Patency:  Patent    Post Op Vitals Reviewed: Yes          Staff: Anesthesiologist           BP     Temp      Pulse     Resp      SpO2 Gene

## 2021-08-25 NOTE — BRIEF OPERATIVE NOTE - NSICDXBRIEFPROCEDURE_GEN_ALL_CORE_FT
PROCEDURES:  Removal, nail plate, partial, complete, or permanent, with matrixectomy 25-Aug-2021 09:25:27  Luis Mills

## 2021-08-25 NOTE — ASU PREOP CHECKLIST - IDENTIFICATION BAND VERIFIED
From: Mustapha Rose  To: Johan Govea  Sent: 2/1/2021 5:18 PM CST  Subject: Non-Urgent Medical Question    Good afternoon. My wife Kathy just received a code and was able to schedule her Covid-19 vaccine for Thursday, February 4 at 1:45 at [place]. That's great. My question is whether it is possible for me to schedule near the same time. If not, do you know if I am able to accompany her? I DO NOT want and schedule accommodation for me to substantially change her's. She is recovering from pancreatic cancer and may be in a higher risk category. Thanks. Nazario   done

## 2021-09-07 NOTE — CHART NOTE - NSCHARTNOTEFT_GEN_A_CORE
Medicine Progress Note    Patient is a 81y old  Male who presents with a chief complaint of Covid+.  Pt offers no complaints at this time    PHYSICAL EXAM:  Vital Signs Last 24 Hrs  T(C): 37 (07 Sep 2021 13:18), Max: 37.1 (07 Sep 2021 12:16)  T(F): 98.6 (07 Sep 2021 13:18), Max: 98.8 (07 Sep 2021 12:16)  HR: 82 (07 Sep 2021 13:18) (80 - 90)  BP: 141/84 (07 Sep 2021 13:18) (138/80 - 145/83)  BP(mean): --  RR: 18 (07 Sep 2021 13:18) (17 - 19)  SpO2: 96% (07 Sep 2021 13:18) (95% - 97%)    CONSTITUTIONAL: NAD, well-developed, well-groomed  ENMT: Moist oral mucosa, no pharyngeal injection or exudates; normal dentition  RESPIRATORY: Normal respiratory effort; lungs are clear to auscultation bilaterally  CARDIOVASCULAR: Regular rate and rhythm, normal S1 and S2, no murmur/rub/gallop; No lower extremity edema; Peripheral pulses are 2+ bilaterally  ABDOMEN: Nontender to palpation, normoactive bowel sounds, no rebound/guarding; No hepatosplenomegaly  PSYCH: A+O to person, place, and time; affect appropriate  NEUROLOGY: CN 2-12 are intact and symmetric; no gross sensory deficits   SKIN: No rashes; no palpable lesions    -s/p Ab infusion  -Tyl prn temp >100.4  -If worsening of condition present to ED

## 2021-09-10 NOTE — ED PROVIDER NOTE - OBJECTIVE STATEMENT
Pt is a 80y/o male with a pmhx of CAD/CABG, htn, hld, DM on day 11 of COVID symptoms despite being fully vaccinated with Pfizer, presents today for low pulse ox reads (80's) on pulse oximeter at home. Pt admits to associated productive cough.  Pt weakness, numbness, CP, SOB.

## 2021-09-10 NOTE — ED PROVIDER NOTE - NSFOLLOWUPINSTRUCTIONS_ED_ALL_ED_FT
Upon discharge, you must self-quarantine for 14 days TOTAL , or until the Department of Health contacts you. Please wear a face mask if you are around other individuals. Try to avoid contact with house members, family, and friends for the duration of this quarantine. Please follow up with your primary care physician within 2-3 weeks of your discharge from the hospital. Please take all medications as prescribed. If you experience any worsening or recurrence of your symptoms, particularly worsening or high fever, shortness of breathe, extreme fatigue, or bloody cough please call 9-1-1 immediately or report to the nearest Emergency Department.

## 2021-09-10 NOTE — ED PROVIDER NOTE - ATTENDING CONTRIBUTION TO CARE
82 yo m with pmh of cad, dm, htn, sent by dr. tian for hypoxia evaluation.  pt is covid positive, day 11.  pt's wife was able to get a pulse oximeter and checked him today and found it to low in the 80s.  pt denies sob.  still having cough.  pt is fully vaccinated with pfizer.  pt denies cp despite triage note.  exam: nad, ncat, perrl, eomi, mmm, rrr, ctab, abd soft, nt,nd aox3, imp: pt with covid, sent in for eval of hypoxia.  pt's pulse ox here has been in mid to high 90s even on exertion, will check labs and observe pt

## 2021-09-10 NOTE — ED PROVIDER NOTE - CARE PROVIDER_API CALL
[id number] [place] [address]  Phone: [phone]  Fax: [phone]  Follow Up Time:

## 2021-09-10 NOTE — ED PROVIDER NOTE - PROGRESS NOTE DETAILS
pt looks well, feels well, 02 sat has been high 90's while here even during and after exertion, discussed case with pt's PMD and family friend Dr. Frias and is aware and agreeable of plan to DC

## 2021-09-10 NOTE — ED PROVIDER NOTE - CLINICAL SUMMARY MEDICAL DECISION MAKING FREE TEXT BOX
pt with covid, sent in for eval of hypoxia.  pt's pulse ox here has been in mid to high 90s even on exertion, no evidence of significant hypoxia during ED observation.  dr. tian called and informed of w/u

## 2021-09-10 NOTE — ED PROVIDER NOTE - NS ED ROS FT
Eyes:  No visual changes, eye pain or discharge.  ENMT:  No hearing changes, pain, discharge or infections. No neck pain or stiffness.  Cardiac:  No chest pain, SOB or edema  Respiratory:  + cough No respiratory distress. No hemoptysis. No history of asthma or RAD.  GI:  No nausea, vomiting, diarrhea or abdominal pain.  :  No dysuria, frequency or burning.  MS:  No myalgia, muscle weakness, joint pain or back pain.  Neuro:  No headache or weakness.  No LOC.  Skin:  No skin rash.   Endocrine: + DM No history of thyroid disease   Except as documented in the HPI,  all other systems are negative.

## 2021-09-10 NOTE — ED PROVIDER NOTE - PATIENT PORTAL LINK FT
You can access the FollowMyHealth Patient Portal offered by [facility] by registering at the following website: [email or web address]. By joining [employer]’s FollowMyHealth portal, you will also be able to view your health information using other applications (apps) compatible with our system.

## 2021-09-17 NOTE — ED PROVIDER NOTE - CLINICAL SUMMARY MEDICAL DECISION MAKING FREE TEXT BOX
Labs reviewed and Troponin unchanged form prior. . Patient very comfortable on 2 L nasal cannula. VBG reviewed and acid base status is normal.  CT scan shows extensive subsegmental PE's. Given Hemoptysis, patient started on a Heparin drip. Patient hemodynamically stable and will admit to Medicine for anticoagulation and further treatment. CT scan also shows COVID pneumonia, which could all be old findings. Current COVID test is negative but will keep under high risk.

## 2021-09-17 NOTE — H&P ADULT - NSTOBACCOSCREENHP_GEN_A_NCS
- CXR positive for pulmonary congestion.   - IV Lasix for dieresis, will dc on home PO lasix for continued dieresis  - Bipap machine given, interactions on use provided by respiratory therapy   - Follow up with sleep clinic on 11/8 for SNOW  
- previously on amlodipine  - patient reports that she stopped taking her medications secondary to hypotension  - monitor     
- previously on amlodipine, DC 2/2 lower extremity swelling, start Losartan  - Po lasix  - patient reports that she stopped taking her medications secondary to hypotension  - monitor     
- stable  - resume home medications    
- stable  - resume home medications    
49 yo F w/ PMH of Bipolar disorder, depression, and HTN who presents to the ED complaining of shortness of breath and worsening BLE edema for several months. Also endorses orthopnea. Imaging demonstrated pulmonary edema, atelectasis, and vascular congestion. BNP wnl. Troponin neg. No previous cardiac history. Positive smoking history. Concerning for new onset CHF vs OHS vs SNOW vs COPD. BNP can be falsely negative in obese patients. ABG suggests chronic CO2 retainer: pH 7.309, pCO2 71.3, pO2 75, pHCO3 35.9.   - ECHO w/ color flow, bubble contrast pending  - duonebs q6 PRN  - IV lasix 60 mg q12  - strict I/Os, daily weights  - bipap qhs  - arrange sleep study outpatient  - consider bronchodilators should sxs not improve w/ diuresis, bipap  - PFTs as outpatient  
No

## 2021-09-17 NOTE — H&P ADULT - ASSESSMENT
This is a 81 year old M patient with PMHx of CAD s/p CABG, DM, HTN, DLD presented to the ED for Shortness of breath of 2 weeks duration.    #Shortness of breath   #Hemoptysis   #COVID 19 PNA   -Patient symptoms started around 2 weeks ago, Patient tested positive 09/03/2021  -On admission he was hypoxic with SpO2 at rest 88%, improved to 95% on 4 L NC.   -CTA was performed on admission and it showed Bilateral pulmonary artery emboli extending into segmental and subsegmental branches and involving all lobes as above. Start Heparin drip for now ( Patient has normal kidney function. However, given the hemoptysis and the patient description of the amount of blood, would start heparin for now and watch for bleed and if no bleed, would consider changing him to Lovenox/NOAC )  -VA duplex of the LE bilaterally to r/o DVT   -Start Decadron PO 6 mg For 10 days   -ID consult   -Admit to tele     #CAD s/p CABG   -Continue with Aspirin and Toprol 50mg PO daily     #DLD  -Continue lipitor 40 mg PO daily     #Insulin dependent DM   - Patient is on insulin and Metformin at home   -Hold metformin, start insulin 25/8/8/8 with sliding scale, follow FS    #Mild troponin elevation   -On admission troponin was 0.03, stable from one week ago   -Patient does not have any chest pain, can be related to his PE or his infection   -EKG did not show any acute ischemic changes   -recheck troponin in the Am    #Mild transaminitis   -Can be related to COVID. However, Bilirubin is slightly elevated as well   -Patient denied any RUQ abdominal pain, check RUQ US if trending up   -Daily CMP for now     #Pseudohyponatremia   -On admission Na was 133, Glu was 300s, corrected > 135.         #DVT ppx: Heparin gtt   #GI ppx: protonix   #Diet: DASh, carb consistent   #Dispo: Tele, Acute

## 2021-09-17 NOTE — H&P ADULT - NSHPPHYSICALEXAM_GEN_ALL_CORE
General: Elderly male in Mild distress due to dyspnea   Head and Neck: NC, AT, No JVD   Heart: S1,S2 appreciated, tachycardic, regular rhythm   Lungs: bilateral decreased breath sounds more on the right. Crackles at the bases bilaterally, no wheeze  Abdomen: soft, nontender, nondistended  LE: no edema   Neuro: A&O*3, general motor exam Is normal

## 2021-09-17 NOTE — ED PROVIDER NOTE - ATTENDING CONTRIBUTION TO CARE
80 y/o male, PMH of CAD/CABG, HTN, HLD, DM, day 18 of COVID symptoms, confirmed COVID (+) on 9/3, vaccinated with Pfizer, presents today for blood streaked sputum when he coughs, SOB and weakness. On exam, pt in NAD, AAOx3, head NC/AT, CN II-XII intact, lungs CTA B/L, CV S1S2 regular, abdomen soft/NT/ND/(+)BS, ext (-) edema, motor 5/5x4, sensation intact. Will do labs, CXR, EKG and reevaluate.

## 2021-09-17 NOTE — ED PROVIDER NOTE - PROGRESS NOTE DETAILS
Patient desaturates to 88% on RA< improved to 95% on 2L NC. attempted to call daughter (Emergency contact) and wife unsuccessfully. Sepsis is being considered in this patient.  30cc/kg fluid bolus was not given due to contraindication, e.g. acute pulmonary edema, ESRD, LVAD, hospice care, or patient/family refusal.

## 2021-09-17 NOTE — ED PROVIDER NOTE - OBJECTIVE STATEMENT
81M hx CAD s/p CABG, HLD< HTN, DM, COVID+ on 9/3 presents with hemoptysis. Patient notes blood streaked sputum when he coughs for the past 3 days, is currently day 12 of symptoms, vaccinated with J&J. He has had increased shortness of breath, denies chest pain/syncope/abd pain/vomiting/diarrhea. Patient does not use oxygen at home, but found to be hypoxic to 88% on RA here when sitting up. NO anticoagulation noted.

## 2021-09-17 NOTE — H&P ADULT - NSHPLABSRESULTS_GEN_ALL_CORE
12.7   11.80 )-----------( 163      ( 17 Sep 2021 11:10 )             38.1       09-17    133<L>  |  98  |  29<H>  ----------------------------<  348<H>  5.5<H>   |  11<L>  |  1.3    Ca    8.5      17 Sep 2021 10:36    TPro  7.7  /  Alb  3.2<L>  /  TBili  1.3<H>  /  DBili  x   /  AST  62<H>  /  ALT  50<H>  /  AlkPhos  106  09-17                  PT/INR - ( 17 Sep 2021 17:30 )   PT: 18.90 sec;   INR: 1.65 ratio         PTT - ( 17 Sep 2021 17:30 )  PTT:29.0 sec    Lactate Trend      CARDIAC MARKERS ( 17 Sep 2021 10:36 )  x     / 0.03 ng/mL / x     / x     / x          EX[facility]  [facility]            PROCEDURE DATE:  09/17/2021      IMPRESSION:  1.  Bilateral pulmonary artery emboli extending into segmental and subsegmental branches and involving all lobes.  2.  No evidence of right heart strain.  3.  Bilateral, peripheral predominant, groundglass opacities. Findings likely reflect sequela of viral pneumonia such as COVID-19.    Dr. Colleen Fitzpatrick discussed preliminary findings with KAITLIN GIORDANO on 9/17/2021 5:11 PM with readback.

## 2021-09-17 NOTE — H&P ADULT - HISTORY OF PRESENT ILLNESS
This is a 81 year old M patient with PMHx of CAD s/p CABG, DM, HTN, DLD presented to the ED for Shortness of breath of 2 weeks duration. Patient was doing relatively well until around 2 weeks prior to admission when he started experiencing shortness of breath. Patient started experiencing shortness of breath by the beginning of september and he got tested for COVID 09/03/2021 and it was positive. Since then the patient has been experiencing progressive dyspnea on exertion ( now occur at rest ). His SOB was associated with cough that was productive of clear sputum until one week prior to admission when he started noticing blood in his sputum.  This is a 81 year old M patient with PMHx of CAD s/p CABG, DM, HTN, DLD presented to the ED for Shortness of breath of 2 weeks duration. Patient was doing relatively well until around 2 weeks prior to admission when he started experiencing shortness of breath. Patient started experiencing shortness of breath by the beginning of september and he got tested for COVID 09/03/2021 and it was positive. Since then the patient has been experiencing progressive dyspnea on exertion ( now occur at rest ). His SOB was associated with cough that was productive of clear sputum until one week prior to admission when he started noticing blood in his sputum. He stated that it only happens in the morning when he wakes up and it is at least ( 2 tablespoons ) in quantity. Patient denied any fevers but mentioned daily chills and headache. besides that patient denied chest pain, palpitation, nausea, vomiting or any other symptoms.     Off note: patient stated that he received 2 doses of pfizer vaccine.     In the ED Vital Signs Last 24 Hrs  T(C): 35.6 (17 Sep 2021 15:12), Max: 38.2 (17 Sep 2021 13:05)  T(F): 96.1 (17 Sep 2021 15:12), Max: 100.7 (17 Sep 2021 13:05)  HR: 95 (17 Sep 2021 17:53) (87 - 105)  BP: 141/72 (17 Sep 2021 17:53) (141/72 - 159/72)  BP(mean): --  RR: 16 (17 Sep 2021 17:53) (16 - 20)  SpO2: 96% (17 Sep 2021 17:53) (94% - 97%)

## 2021-09-17 NOTE — ED PROVIDER NOTE - PHYSICAL EXAMINATION
Vital Signs: I have reviewed the initial vital signs.  Constitutional: well-nourished, appears stated age, no acute distress.  HEENT: Airway patent, moist MM, no erythema/swelling/deformity of oral structures. EOMI, PERRLA.  CV: regular rate, regular rhythm, well-perfused extremities, 2+ b/l DP and radial pulses equal.  Lungs: BCTA, tachypneic, mildly increased WOB.  ABD: soft, NTND, no guarding or rebound, no pulsatile mass, no hernias, no flank pain.   MSK: Neck supple, nontender, nl ROM, no stepoff. Chest nontender. Back nontender in TLS spine or to b/l bony structures. Ext nontender, nl rom, no deformity.   INTEG: Skin warm, dry, no rash.  NEURO: A&Ox3, moving all extremities, normal speech  PSYCH: Calm, cooperative, normal affect and interaction.

## 2021-09-17 NOTE — ED PROVIDER NOTE - CARE PLAN
Principal Discharge DX:	Pulmonary embolism   1 Principal Discharge DX:	Pulmonary embolism  Secondary Diagnosis:	Pneumonia due to COVID-19 virus

## 2021-09-18 NOTE — PROGRESS NOTE ADULT - SUBJECTIVE AND OBJECTIVE BOX
Pt was seen and examined at the bedside.  Reports feeling tired and SOB.     ROS: neg except as noted above    PAST MEDICAL & SURGICAL HISTORY:  Heart attack    Diabetes    Hypertension    Atherosclerotic heart disease    Hyperlipidemia    S/P CABG x 1  21 years ago    H/O neck surgery  2017    Previous back surgery  18 yrs ago    Allergies    No Known Allergies    Intolerances    MEDICATIONS  (STANDING):  aspirin enteric coated 81 milliGRAM(s) Oral daily  atorvastatin Oral Tab/Cap - Peds 40 milliGRAM(s) Oral daily  chlorhexidine 4% Liquid 1 Application(s) Topical <User Schedule>  dexAMETHasone  Injectable 6 milliGRAM(s) IV Push daily  dextrose 40% Gel 15 Gram(s) Oral once  dextrose 5%. 1000 milliLiter(s) (50 mL/Hr) IV Continuous <Continuous>  dextrose 5%. 1000 milliLiter(s) (100 mL/Hr) IV Continuous <Continuous>  dextrose 50% Injectable 25 Gram(s) IV Push once  dextrose 50% Injectable 12.5 Gram(s) IV Push once  dextrose 50% Injectable 25 Gram(s) IV Push once  enoxaparin Injectable 90 milliGRAM(s) SubCutaneous two times a day  gabapentin 300 milliGRAM(s) Oral at bedtime  glucagon  Injectable 1 milliGRAM(s) IntraMuscular once  insulin glargine Injectable (LANTUS) 25 Unit(s) SubCutaneous at bedtime  insulin lispro (ADMELOG) corrective regimen sliding scale   SubCutaneous three times a day before meals  insulin lispro Injectable (ADMELOG) 8 Unit(s) SubCutaneous three times a day before meals  metoprolol succinate ER 50 milliGRAM(s) Oral daily    MEDICATIONS  (PRN):    Vital Signs Last 24 Hrs  T(C): 36.6 (18 Sep 2021 05:40), Max: 38.2 (17 Sep 2021 13:05)  T(F): 97.8 (18 Sep 2021 05:40), Max: 100.7 (17 Sep 2021 13:05)  HR: 102 (18 Sep 2021 05:40) (87 - 102)  BP: 132/85 (18 Sep 2021 05:40) (132/85 - 159/72)  BP(mean): --  RR: 20 (18 Sep 2021 05:40) (16 - 20)  SpO2: 94% (18 Sep 2021 05:40) (94% - 98%)    Physical Exam:  General: Elderly male in Mild distress due to dyspnea   Head and Neck: NC, AT, No JVD   Heart: S1,S2 appreciated, tachycardic, regular rhythm   Lungs: bilateral decreased breath sounds more on the right. Crackles at the bases bilaterally, no wheeze  Abdomen: soft, nontender, nondistended  LE: no edema   Neuro: A&O*3, general motor exam Is normal    Labs:  CBC                        12.7   11.80 )-----------( 163      ( 17 Sep 2021 11:10 )             38.1   CMP  09-17    133<L>  |  98  |  29<H>  ----------------------------<  348<H>  5.5<H>   |  11<L>  |  1.3    Ca    8.5      17 Sep 2021 10:36    TPro  7.7  /  Alb  3.2<L>  /  TBili  1.3<H>  /  DBili  x   /  AST  62<H>  /  ALT  50<H>  /  AlkPhos  106  09-17      < from: CT Angio Chest PE Protocol w/ IV Cont (09.17.21 @ 16:01) >  IMPRESSION:  1.  Bilateral pulmonary artery emboli extending into segmental and subsegmental branches and involving all lobes.  2.  No evidence of right heart strain.  3.  Bilateral, peripheral predominant, groundglass opacities. Findings likely reflect sequela of viral pneumonia such as COVID-19.    Dr. Colleen Fitzpatrick discussed preliminary findings with KAITLIN GIORDANO on 9/17/2021 5:11 PM with readback.    --- End of Report ---  EXAM:  CT ANGIO CHEST PULM ART [facility]            PROCEDURE DATE:  09/17/2021      < end of copied text >

## 2021-09-18 NOTE — PROGRESS NOTE ADULT - ASSESSMENT
Mr Contreras is a 81 year old Man with Medical Hx of CAD s/p CABG, DM, HTN, DLD presented to the ED for Shortness of breath of 2 weeks duration.    #Shortness of breath   #Hemoptysis   #COVID 19 PNA   #Bilateral Pulmonary Emboli  Patient symptoms started around 2 weeks ago, Patient tested positive 09/03/2021  On admission he was hypoxic with SpO2 at rest 88%, improved to 95% on 4 L NC.   CTA was performed on admission and it showed Bilateral pulmonary artery emboli extending into segmental and subsegmental branches and involving all lobes as above. Start Heparin drip for now ( Patient has normal kidney function. However, given the hemoptysis and the patient description of the amount of blood, would start heparin for now and watch for bleed and if no bleed, would consider changing him to Lovenox/NOAC )  VA duplex of the LE bilaterally neg  ID recs appreciated  c/w Decadron PO 6 mg For 10 days    c/w Lovenox 90 BID     #CAD s/p CABG   c/w Aspirin and Toprol 50mg PO daily     #DLD  c/w lipitor 40 mg PO daily     #Insulin dependent DM   Patient is on insulin and Metformin at home   Hold metformin  c/w insulin 25/8/8/8 with sliding scale, follow FS    #Mild troponin elevation   On admission troponin was 0.03, stable from one week ago   Patient does not have any chest pain, can be related to his PE or his infection   EKG did not show any acute ischemic changes   f/u Repeat Troponin    #Mild transaminitis   Can be related to COVID. However, Bilirubin is slightly elevated as well   Patient denied any RUQ abdominal pain, check RUQ US if trending up   Daily CMP for now     #Pseudohyponatremia   On admission Na was 133, Glu was 300s, corrected > 135.         DVT ppx: Heparin gtt   GI ppx: protonix     Progress Note Handoff:  Pending: clinical improvement   Dispo: Tele, Acute

## 2021-09-18 NOTE — CONSULT NOTE ADULT - SUBJECTIVE AND OBJECTIVE BOX
ROSALINA BLOOM  81y, Male  Allergy: No Known Allergies      All historical available data reviewed.    HPI:  This is a 81 year old M patient with PMHx of CAD s/p CABG, DM, HTN, DLD presented to the ED for Shortness of breath of 2 weeks duration. Patient was doing relatively well until around 2 weeks prior to admission when he started experiencing shortness of breath. Patient started experiencing shortness of breath by the beginning of september and he got tested for COVID 2021 and it was positive. Since then the patient has been experiencing progressive dyspnea on exertion ( now occur at rest ). His SOB was associated with cough that was productive of clear sputum until one week prior to admission when he started noticing blood in his sputum. He stated that it only happens in the morning when he wakes up and it is at least ( 2 tablespoons ) in quantity. Patient denied any fevers but mentioned daily chills and headache. besides that patient denied chest pain, palpitation, nausea, vomiting or any other symptoms.     Off note: patient stated that he received 2 doses of pfizer vaccine.     In the ED Vital Signs Last 24 Hrs  T(C): 35.6 (17 Sep 2021 15:12), Max: 38.2 (17 Sep 2021 13:05)  T(F): 96.1 (17 Sep 2021 15:12), Max: 100.7 (17 Sep 2021 13:05)  HR: 95 (17 Sep 2021 17:53) (87 - 105)  BP: 141/72 (17 Sep 2021 17:53) (141/72 - 159/72)  BP(mean): --  RR: 16 (17 Sep 2021 17:53) (16 - 20)  SpO2: 96% (17 Sep 2021 17:53) (94% - 97%) (17 Sep 2021 17:56)    FAMILY HISTORY:    PAST MEDICAL & SURGICAL HISTORY:  Heart attack    Diabetes    Hypertension    Atherosclerotic heart disease    Hyperlipidemia    S/P CABG x 1  21 years ago    H/O neck surgery  2017    Previous back surgery  18 yrs ago          VITALS:  T(F): 97.8, Max: 100.7 (21 @ 13:05)  HR: 102  BP: 132/85  RR: 20Vital Signs Last 24 Hrs  T(C): 36.6 (18 Sep 2021 05:40), Max: 38.2 (17 Sep 2021 13:05)  T(F): 97.8 (18 Sep 2021 05:40), Max: 100.7 (17 Sep 2021 13:05)  HR: 102 (18 Sep 2021 05:40) (87 - 105)  BP: 132/85 (18 Sep 2021 05:40) (132/85 - 159/72)  BP(mean): --  RR: 20 (18 Sep 2021 05:40) (16 - 20)  SpO2: 94% (18 Sep 2021 05:40) (94% - 98%)    TESTS & MEASUREMENTS:                        12.7   11.80 )-----------( 163      ( 17 Sep 2021 11:10 )             38.1         133<L>  |  98  |  29<H>  ----------------------------<  348<H>  5.5<H>   |  11<L>  |  1.3    Ca    8.5      17 Sep 2021 10:36    TPro  7.7  /  Alb  3.2<L>  /  TBili  1.3<H>  /  DBili  x   /  AST  62<H>  /  ALT  50<H>  /  AlkPhos  106      LIVER FUNCTIONS - ( 17 Sep 2021 10:36 )  Alb: 3.2 g/dL / Pro: 7.7 g/dL / ALK PHOS: 106 U/L / ALT: 50 U/L / AST: 62 U/L / GGT: x             Urinalysis Basic - ( 17 Sep 2021 16:58 )    Color: Yellow / Appearance: Clear / S.035 / pH: x  Gluc: x / Ketone: Negative  / Bili: Negative / Urobili: <2 mg/dL   Blood: x / Protein: 300 mg/dL / Nitrite: Negative   Leuk Esterase: Negative / RBC: 4 /HPF / WBC 2 /HPF   Sq Epi: x / Non Sq Epi: 1 /HPF / Bacteria: Negative          RADIOLOGY & ADDITIONAL TESTS:  Personal review of radiological diagnostics performed  Echo and EKG results noted when applicable.     MEDICATIONS:  aspirin enteric coated 81 milliGRAM(s) Oral daily  atorvastatin Oral Tab/Cap - Peds 40 milliGRAM(s) Oral daily  chlorhexidine 4% Liquid 1 Application(s) Topical <User Schedule>  dextrose 40% Gel 15 Gram(s) Oral once  dextrose 5%. 1000 milliLiter(s) IV Continuous <Continuous>  dextrose 5%. 1000 milliLiter(s) IV Continuous <Continuous>  dextrose 50% Injectable 25 Gram(s) IV Push once  dextrose 50% Injectable 12.5 Gram(s) IV Push once  dextrose 50% Injectable 25 Gram(s) IV Push once  gabapentin 300 milliGRAM(s) Oral at bedtime  glucagon  Injectable 1 milliGRAM(s) IntraMuscular once  heparin   Injectable 7500 Unit(s) IV Push every 6 hours PRN  heparin   Injectable 3500 Unit(s) IV Push every 6 hours PRN  heparin  Infusion.  Unit(s)/Hr IV Continuous <Continuous>  insulin glargine Injectable (LANTUS) 25 Unit(s) SubCutaneous at bedtime  insulin lispro (ADMELOG) corrective regimen sliding scale   SubCutaneous three times a day before meals  insulin lispro Injectable (ADMELOG) 8 Unit(s) SubCutaneous three times a day before meals  metoprolol succinate ER 50 milliGRAM(s) Oral daily      ANTIBIOTICS:

## 2021-09-18 NOTE — CONSULT NOTE ADULT - ASSESSMENT
81 year old M patient with PMHx of CAD s/p CABG, DM, HTN, DLD presented to the ED for Shortness of breath of 2 weeks duration. Patient was doing relatively well until around 2 weeks prior to admission when he started experiencing shortness of breath. Patient started experiencing shortness of breath by the beginning of september, COVID 09/03/2021 was positive. Since then the patient has been experiencing progressive dyspnea on exertion ( now occur at rest ). His SOB was associated with cough that was productive of clear sputum until one week prior to admission when he started noticing blood in his sputum. He stated that it only happens in the morning when he wakes up and it is at least ( 2 tablespoons ) in quantity  S/p  2 doses of pfizer vaccine.     IMPRESSION;  COVID 19 with severe illness. SpO2 < 94% on RA and need for supplemental O2.  Pt is in the late inflammatory response phase ot the illness based on the onset of symptoms.  Elevation of the inflammatory markers   Inflammatory markers are elevated and suggestive of a cytokine response/cytokine storm.  procalcitonin 0.23  , not suggestive of a bacterial PNA.  Ferritin 669  CRP 65  Ddimers 608  9/17 CT angio : PE in all lobes. GGOP    RECOMMENDATIONS;  Target SpO2 92 % to 96 %  Dexamethasone 6 mg iv q24h for 10 days.  Monitor for side effects: hyperglycemia, neurological ( agitation/confusion), adrenal suppression, bacterial and fungal infections  Anticoagulation as per team.

## 2021-09-19 NOTE — PROGRESS NOTE ADULT - SUBJECTIVE AND OBJECTIVE BOX
ROSALINA BLOOM  81y, Male    All available historical data reviewed    OVERNIGHT EVENTS:  no fevers  96%[id number]    ROS:  General: Denies rigors, nightsweats  HEENT: Denies headache, rhinorrhea, sore throat, eye pain  CV: Denies CP, palpitations  PULM: Denies wheezing, hemoptysis  GI: Denies hematemesis, hematochezia, melena  : Denies discharge, hematuria  MSK: Denies arthralgias, myalgias  SKIN: Denies rash, lesions  NEURO: Denies paresthesias, weakness  PSYCH: Denies depression, anxiety    VITALS:  T(F): 97.9, Max: 98.7 (21 @ 15:34)  HR: 80  BP: 150/83  RR: 19Vital Signs Last 24 Hrs  T(C): 36.6 (18 Sep 2021 23:47), Max: 37.1 (18 Sep 2021 15:34)  T(F): 97.9 (18 Sep 2021 23:47), Max: 98.7 (18 Sep 2021 15:34)  HR: 80 (19 Sep 2021 05:16) (78 - 99)  BP: 150/83 (19 Sep 2021 05:16) (117/73 - 150/83)  BP(mean): --  RR: 19 (19 Sep 2021 05:16) (19 - 22)  SpO2: 96% (19 Sep 2021 05:16) (93% - 97%)    TESTS & MEASUREMENTS:                        12.7   11.80 )-----------( 163      ( 17 Sep 2021 11:10 )             38.1         133<L>  |  98  |  29<H>  ----------------------------<  348<H>  5.5<H>   |  11<L>  |  1.3    Ca    8.5      17 Sep 2021 10:36    TPro  7.7  /  Alb  3.2<L>  /  TBili  1.3<H>  /  DBili  x   /  AST  62<H>  /  ALT  50<H>  /  AlkPhos  106      LIVER FUNCTIONS - ( 17 Sep 2021 10:36 )  Alb: 3.2 g/dL / Pro: 7.7 g/dL / ALK PHOS: 106 U/L / ALT: 50 U/L / AST: 62 U/L / GGT: x             Culture - Urine (collected 21 @ 17:01)  Source: Clean Catch Clean Catch (Midstream)  Final Report (21 @ 04:50):    <10,000 CFU/mL Normal Urogenital Emilee      Urinalysis Basic - ( 17 Sep 2021 16:58 )    Color: Yellow / Appearance: Clear / S.035 / pH: x  Gluc: x / Ketone: Negative  / Bili: Negative / Urobili: <2 mg/dL   Blood: x / Protein: 300 mg/dL / Nitrite: Negative   Leuk Esterase: Negative / RBC: 4 /HPF / WBC 2 /HPF   Sq Epi: x / Non Sq Epi: 1 /HPF / Bacteria: Negative          RADIOLOGY & ADDITIONAL TESTS:  Personal review of radiological diagnostics performed  Echo and EKG results noted when applicable.     MEDICATIONS:  aspirin enteric coated 81 milliGRAM(s) Oral daily  atorvastatin Oral Tab/Cap - Peds 40 milliGRAM(s) Oral daily  chlorhexidine 4% Liquid 1 Application(s) Topical <User Schedule>  dexAMETHasone  Injectable 6 milliGRAM(s) IV Push daily  dextrose 40% Gel 15 Gram(s) Oral once  dextrose 5%. 1000 milliLiter(s) IV Continuous <Continuous>  dextrose 5%. 1000 milliLiter(s) IV Continuous <Continuous>  dextrose 50% Injectable 25 Gram(s) IV Push once  dextrose 50% Injectable 12.5 Gram(s) IV Push once  dextrose 50% Injectable 25 Gram(s) IV Push once  enoxaparin Injectable 90 milliGRAM(s) SubCutaneous two times a day  gabapentin 300 milliGRAM(s) Oral at bedtime  glucagon  Injectable 1 milliGRAM(s) IntraMuscular once  insulin glargine Injectable (LANTUS) 25 Unit(s) SubCutaneous at bedtime  insulin lispro (ADMELOG) corrective regimen sliding scale   SubCutaneous three times a day before meals  insulin lispro Injectable (ADMELOG) 8 Unit(s) SubCutaneous three times a day before meals  metoprolol succinate ER 50 milliGRAM(s) Oral daily      ANTIBIOTICS:

## 2021-09-19 NOTE — PROGRESS NOTE ADULT - SUBJECTIVE AND OBJECTIVE BOX
S: breathing stable  No cp/fever      All other pertinent ROS negative.      Vital Signs Last 24 Hrs  T(C): 36.4 (19 Sep 2021 08:36), Max: 36.6 (18 Sep 2021 23:47)  T(F): 97.6 (19 Sep 2021 08:36), Max: 97.9 (18 Sep 2021 23:47)  HR: 92 (19 Sep 2021 08:36) (78 - 92)  BP: 153/83 (19 Sep 2021 08:36) (117/73 - 153/83)  BP(mean): --  RR: 20 (19 Sep 2021 08:36) (19 - 20)  SpO2: 95% (19 Sep 2021 08:36) (95% - 96%)  PHYSICAL EXAM:    Constitutional: NAD, awake and alert, well-developed  HEENT: PERR, EOMI, Normal Hearing, MMM  Neck: Soft and supple, No LAD, No JVD  Respiratory: occ rhonchi  Cardiovascular: S1 and S2, regular rate and rhythm, no Murmurs, gallops or rubs  Gastrointestinal: Bowel Sounds present, soft, nontender, nondistended, no guarding, no rebound  Extremities: No peripheral edema      MEDICATIONS:  MEDICATIONS  (STANDING):  aspirin enteric coated 81 milliGRAM(s) Oral daily  atorvastatin Oral Tab/Cap - Peds 40 milliGRAM(s) Oral daily  chlorhexidine 4% Liquid 1 Application(s) Topical <User Schedule>  dexAMETHasone  Injectable 6 milliGRAM(s) IV Push daily  dextrose 40% Gel 15 Gram(s) Oral once  dextrose 5%. 1000 milliLiter(s) (50 mL/Hr) IV Continuous <Continuous>  dextrose 5%. 1000 milliLiter(s) (100 mL/Hr) IV Continuous <Continuous>  dextrose 50% Injectable 25 Gram(s) IV Push once  dextrose 50% Injectable 12.5 Gram(s) IV Push once  dextrose 50% Injectable 25 Gram(s) IV Push once  enoxaparin Injectable 90 milliGRAM(s) SubCutaneous two times a day  gabapentin 300 milliGRAM(s) Oral at bedtime  glucagon  Injectable 1 milliGRAM(s) IntraMuscular once  insulin glargine Injectable (LANTUS) 25 Unit(s) SubCutaneous at bedtime  insulin lispro (ADMELOG) corrective regimen sliding scale   SubCutaneous three times a day before meals  insulin lispro Injectable (ADMELOG) 8 Unit(s) SubCutaneous three times a day before meals  metoprolol succinate ER 50 milliGRAM(s) Oral daily  pantoprazole    Tablet 40 milliGRAM(s) Oral before breakfast      LABS: All Labs Reviewed:                        12.7   9.16  )-----------( 226      ( 19 Sep 2021 11:20 )             39.2     09-19    132<L>  |  99  |  28<H>  ----------------------------<  296<H>  4.9   |  19  |  1.0    Ca    8.2<L>      19 Sep 2021 11:20  Mg     2.1     09-19    TPro  6.9  /  Alb  2.9<L>  /  TBili  1.2  /  DBili  x   /  AST  68<H>  /  ALT  65<H>  /  AlkPhos  118<H>  09-19    PT/INR - ( 17 Sep 2021 17:30 )   PT: 18.90 sec;   INR: 1.65 ratio         PTT - ( 17 Sep 2021 21:09 )  PTT:48.0 sec  CARDIAC MARKERS ( 18 Sep 2021 16:05 )  x     / <0.01 ng/mL / x     / x     / x          Blood Culture: 09-17 @ 17:01  Organism --  Gram Stain Blood -- Gram Stain --  Specimen Source Clean Catch Clean Catch (Midstream)  Culture-Blood --        Radiology: reviewed

## 2021-09-19 NOTE — PROGRESS NOTE ADULT - ASSESSMENT
Mr Contreras is a 81 year old Man with Medical Hx of CAD s/p CABG, DM, HTN, DLD presented to the ED for Shortness of breath of 2 weeks duration.    #Shortness of breath   #Hemoptysis   #COVID 19 PNA   #Bilateral Pulmonary Emboli  Patient symptoms started around 2 weeks ago, Patient tested positive 09/03/2021  On admission he was hypoxic with SpO2 at rest 88%, improved to 95% on 4 L NC.   CTA was performed on admission and it showed Bilateral pulmonary artery emboli extending into segmental and subsegmental branches and involving all lobes as above. Start lovenox for now ( Patient has normal kidney function. However, given the hemoptysis and the patient description of the amount of blood, would start heparin for now and watch for bleed and if no bleed, would consider changing him to Lovenox/NOAC )  VA duplex of the LE bilaterally neg  ID recs appreciated  c/w Decadron PO 6 mg For 10 days    c/w Lovenox 90 BID - Hb stable for now. May change to PO a/c tomorrow.     #CAD s/p CABG   c/w Aspirin and Toprol 50mg PO daily     #DLD  c/w lipitor 40 mg PO daily     #Insulin dependent DM   Patient is on insulin and Metformin at home   Hold metformin  c/w insulin 25/8/8/8 with sliding scale, follow FS    #Mild troponin elevation   On admission troponin was 0.03, stable from one week ago   Patient does not have any chest pain, can be related to his PE or his infection   EKG did not show any acute ischemic changes   f/u Repeat Troponin    #Mild transaminitis   Can be related to COVID. However, Bilirubin is slightly elevated as well   Patient denied any RUQ abdominal pain, check RUQ US if trending up   Daily CMP for now     #Pseudohyponatremia   On admission Na was 133, Glu was 300s, corrected > 135.         DVT ppx: ther. lovenox.   GI ppx: protonix     Progress Note Handoff:  Pending: clinical improvement   Dispo: Tele, Acute   May be able to d/c home w/ home O2 tomorrow and oral steroids, anticoagulant. check walking sats today. anticipate

## 2021-09-19 NOTE — PROGRESS NOTE ADULT - RESPIRATORY
detailed exam
I will START or STAY ON the medications listed below when I get home from the hospital:  None

## 2021-09-19 NOTE — PROGRESS NOTE ADULT - ASSESSMENT
· Assessment	  81 year old M patient with PMHx of CAD s/p CABG, DM, HTN, DLD presented to the ED for Shortness of breath of 2 weeks duration. Patient was doing relatively well until around 2 weeks prior to admission when he started experiencing shortness of breath. Patient started experiencing shortness of breath by the beginning of september, COVID 09/03/2021 was positive. Since then the patient has been experiencing progressive dyspnea on exertion ( now occur at rest ). His SOB was associated with cough that was productive of clear sputum until one week prior to admission when he started noticing blood in his sputum. He stated that it only happens in the morning when he wakes up and it is at least ( 2 tablespoons ) in quantity  S/p  2 doses of pfizer vaccine.     IMPRESSION;  Oxygenation improved  COVID 19 with severe illness. SpO2 < 94% on RA and need for supplemental O2.  Pt is in the late inflammatory response phase ot the illness based on the onset of symptoms.  Elevation of the inflammatory markers   Inflammatory markers are elevated and suggestive of a cytokine response/cytokine storm.  procalcitonin 0.23  , not suggestive of a bacterial PNA.  Ferritin 669  CRP 65  Ddimers 608  9/17 CT angio : PE in all lobes. GGOP    RECOMMENDATIONS;  Target SpO2 92 % to 96 %  Dexamethasone 6 mg iv q24h for 10 days.  Monitor for side effects: hyperglycemia, neurological ( agitation/confusion), adrenal suppression, bacterial and fungal infections  Could finish course of steroids with po prednisone 40 mg q24 for a total of 10 days  Anticoagulation as per team.   recall prn please

## 2021-09-20 NOTE — PROVIDER CONTACT NOTE (OTHER) - BACKGROUND
Pt previous with a blood glucose of 433 prior to lunch. MD Staples #[id number] notified. RN gave standing 8 units plus SS insulin as ordered. RN instructed to then re-check sugar after lunch.

## 2021-09-20 NOTE — PROGRESS NOTE ADULT - ASSESSMENT
81 year old Man with Medical Hx of CAD s/p CABG, DM, HTN, DLD presented to the ED for Shortness of breath of 2 weeks duration.    A/P:   Acute Hypoxic Respiratory failure:   COVID-19 Pneumonia: Severe.   Acute Bilateral Pulmonary Emboli:   COVID positive since 9/3/21  Still Hypoxic on 2-3L NC.   CT chest showed Bilateral pulmonary artery emboli extending into segmental and subsegmental branches and involving all lobes. Bilateral, peripheral predominant, groundglass opacities.  Venous Duplex of LE negative.   Procalcitonin 0.23, Ferritin 669,     Continue Dexamethasone 6mg IV daily for 10 days,   Continue [id number] BID, switch to Xarelto 15mg BID for 21 days then 20mg daily.       CAD s/p CABG   Troponin now negative 0.01, no chest pain.   Continue Aspirin, Metoprolol and Lipitor.       DM type 2:  FS is elevated, likely from IV steroid.   Increase Lantus from 25 to 35 untis and Humalog from 8 to 11 units 3 times daily before meals.   Send A1C.     Mild transaminitis   Likely form COVID-19 infection.     DVT Prophylaxis: Lovenox therapeutic.   GI Prophylaxis: Protonix.     Progress Note Handoff:  Pending (specify):  improving SOB,   Family discussion:  Disposition: Home with home O2 in 24 hrs.

## 2021-09-20 NOTE — PROVIDER CONTACT NOTE (OTHER) - SITUATION
Pt with a current blood glucose of 340 after receiving 14 units of short acting insulin at lunch at time.

## 2021-09-20 NOTE — PROGRESS NOTE ADULT - SUBJECTIVE AND OBJECTIVE BOX
LENGTH OF HOSPITAL STAY: 3d    CHIEF COMPLAINT:    Patient is a 81y old  Male who presents with a chief complaint of Hemoptysis, COVID 19 PNA (19 Sep 2021 16:02)    OVERNIGHT EVENTS:    None    FOLLOW UP:        HOSPITAL COURSE:        HPI:    HPI:  This is a 81 year old M patient with PMHx of CAD s/p CABG, DM, HTN, DLD presented to the ED for Shortness of breath of 2 weeks duration. Patient was doing relatively well until around 2 weeks prior to admission when he started experiencing shortness of breath. Patient started experiencing shortness of breath by the beginning of september and he got tested for COVID 09/03/2021 and it was positive. Since then the patient has been experiencing progressive dyspnea on exertion ( now occur at rest ). His SOB was associated with cough that was productive of clear sputum until one week prior to admission when he started noticing blood in his sputum. He stated that it only happens in the morning when he wakes up and it is at least ( 2 tablespoons ) in quantity. Patient denied any fevers but mentioned daily chills and headache. besides that patient denied chest pain, palpitation, nausea, vomiting or any other symptoms.     Off note: patient stated that he received 2 doses of pfizer vaccine.     In the ED Vital Signs Last 24 Hrs  T(C): 35.6 (17 Sep 2021 15:12), Max: 38.2 (17 Sep 2021 13:05)  T(F): 96.1 (17 Sep 2021 15:12), Max: 100.7 (17 Sep 2021 13:05)  HR: 95 (17 Sep 2021 17:53) (87 - 105)  BP: 141/72 (17 Sep 2021 17:53) (141/72 - 159/72)  BP(mean): --  RR: 16 (17 Sep 2021 17:53) (16 - 20)  SpO2: 96% (17 Sep 2021 17:53) (94% - 97%) (17 Sep 2021 17:56)      ALLERGIES:    No Known Allergies    PMHx:    Heart attack; Diabetes; Hypertension; Atherosclerotic heart disease; Hyperlipidemia; S/P CABG x 1; 21 years ago; H/O neck surgery 2017; Previous back surgery 18 yrs ago    SOCIAL Hx:      PHYSICAL EXAM:    Gen: NAD, resting in bed  HEENT: Normocephalic, atraumatic  Neck: supple, no lymphadenopathy  CV: Regular rate & regular rhythm  Lungs: decreased BS at bases, no fremitus  Abdomen: Soft, BS present  Ext: Warm, well perfused  Neuro: non focal, awake  Skin: no rash, no erythema   LENGTH OF HOSPITAL STAY: 3d    CHIEF COMPLAINT:    Patient is a 81y old  Male who presents with a chief complaint of Hemoptysis, COVID 19 PNA (19 Sep 2021 16:02)    OVERNIGHT EVENTS:    None    FOLLOW UP:    - xarelto cost at [facility]; ambulatory O2    HOSPITAL COURSE:        HPI:    HPI:  This is a 81 year old M patient with PMHx of CAD s/p CABG, DM, HTN, DLD presented to the ED for Shortness of breath of 2 weeks duration. Patient was doing relatively well until around 2 weeks prior to admission when he started experiencing shortness of breath. Patient started experiencing shortness of breath by the beginning of september and he got tested for COVID 09/03/2021 and it was positive. Since then the patient has been experiencing progressive dyspnea on exertion ( now occur at rest ). His SOB was associated with cough that was productive of clear sputum until one week prior to admission when he started noticing blood in his sputum. He stated that it only happens in the morning when he wakes up and it is at least ( 2 tablespoons ) in quantity. Patient denied any fevers but mentioned daily chills and headache. besides that patient denied chest pain, palpitation, nausea, vomiting or any other symptoms.     Off note: patient stated that he received 2 doses of pfizer vaccine.     In the ED Vital Signs Last 24 Hrs  T(C): 35.6 (17 Sep 2021 15:12), Max: 38.2 (17 Sep 2021 13:05)  T(F): 96.1 (17 Sep 2021 15:12), Max: 100.7 (17 Sep 2021 13:05)  HR: 95 (17 Sep 2021 17:53) (87 - 105)  BP: 141/72 (17 Sep 2021 17:53) (141/72 - 159/72)  BP(mean): --  RR: 16 (17 Sep 2021 17:53) (16 - 20)  SpO2: 96% (17 Sep 2021 17:53) (94% - 97%) (17 Sep 2021 17:56)      ALLERGIES:    No Known Allergies    PMHx:    Heart attack; Diabetes; Hypertension; Atherosclerotic heart disease; Hyperlipidemia; S/P CABG x 1; 21 years ago; H/O neck surgery 2017; Previous back surgery 18 yrs ago    SOCIAL Hx:    from home    MEDICATIONS:  STANDING MEDICATIONS  aspirin enteric coated 81 milliGRAM(s) Oral daily  atorvastatin Oral Tab/Cap - Peds 40 milliGRAM(s) Oral daily  chlorhexidine 4% Liquid 1 Application(s) Topical <User Schedule>  dexAMETHasone  Injectable 6 milliGRAM(s) IV Push daily  dextrose 40% Gel 15 Gram(s) Oral once  dextrose 5%. 1000 milliLiter(s) IV Continuous <Continuous>  dextrose 5%. 1000 milliLiter(s) IV Continuous <Continuous>  dextrose 50% Injectable 25 Gram(s) IV Push once  dextrose 50% Injectable 12.5 Gram(s) IV Push once  dextrose 50% Injectable 25 Gram(s) IV Push once  gabapentin 300 milliGRAM(s) Oral at bedtime  glucagon  Injectable 1 milliGRAM(s) IntraMuscular once  insulin glargine Injectable (LANTUS) 35 Unit(s) SubCutaneous at bedtime  insulin lispro (ADMELOG) corrective regimen sliding scale   SubCutaneous three times a day before meals  insulin lispro Injectable (ADMELOG) 11 Unit(s) SubCutaneous three times a day before meals  metoprolol succinate ER 50 milliGRAM(s) Oral daily  pantoprazole    Tablet 40 milliGRAM(s) Oral before breakfast  rivaroxaban 15 milliGRAM(s) Oral two times a day with meals    PRN MEDICATIONS      LABS:                        13.0   12.58 )-----------( 265      ( 20 Sep 2021 08:35 )             39.9     09-20    138  |  103  |  39<H>  ----------------------------<  254<H>  5.0   |  17  |  1.1    Ca    8.7      20 Sep 2021 08:35  Mg     2.3     09-20    TPro  7.5  /  Alb  3.1<L>  /  TBili  0.9  /  DBili  x   /  AST  56<H>  /  ALT  66<H>  /  AlkPhos  135<H>  09-20      Culture - Urine (collected 17 Sep 2021 17:01)  Source: Clean Catch Clean Catch (Midstream)  Final Report (19 Sep 2021 04:50):    <10,000 CFU/mL Normal Urogenital Emilee      VITALS:   T(F): 96  HR: 88  BP: 156/90  RR: 21  SpO2: 88%        PHYSICAL EXAM:    Gen: NAD, resting in bed  HEENT: Normocephalic, atraumatic  Neck: supple, no lymphadenopathy  CV: Regular rate & regular rhythm  Lungs: decreased BS at bases, no fremitus, tach/dyspneic   Abdomen: Soft, BS present  Ext: Warm, well perfused  Neuro: non focal, awake  Skin: no rash, no erythema

## 2021-09-20 NOTE — PROVIDER CONTACT NOTE (CRITICAL VALUE NOTIFICATION) - ACTION/TREATMENT ORDERED:
14 units of insulin in total given as per standing 8 units and 6 units per SS. Will recheck sugar to follow up.

## 2021-09-20 NOTE — PROGRESS NOTE ADULT - SUBJECTIVE AND OBJECTIVE BOX
ROSALINA BLOOM  81y  Male      Patient is a 81y old  Male who presents with a chief complaint of Hemoptysis, COVID 19 PNA (20 Sep 2021 07:03)      INTERVAL HPI/OVERNIGHT EVENTS:  He is still with SOB and cough, no fever.   Vital Signs Last 24 Hrs  T(C): 36.2 (20 Sep 2021 05:26), Max: 36.2 (20 Sep 2021 05:26)  T(F): 97.1 (20 Sep 2021 05:26), Max: 97.1 (20 Sep 2021 05:26)  HR: 85 (20 Sep 2021 05:26) (85 - 90)  BP: 153/91 (20 Sep 2021 05:26) (109/67 - 153/91)  BP(mean): --  RR: 18 (20 Sep 2021 08:00) (16 - 20)  SpO2: 95% (20 Sep 2021 08:00) (89% - 97%)      09-20-21 @ 07:01  -  09-20-21 @ 13:00  --------------------------------------------------------  IN: 450 mL / OUT: 0 mL / NET: 450 mL            Consultant(s) Notes Reviewed:  [x ] YES  [ ] NO          MEDICATIONS  (STANDING):  aspirin enteric coated 81 milliGRAM(s) Oral daily  atorvastatin Oral Tab/Cap - Peds 40 milliGRAM(s) Oral daily  chlorhexidine 4% Liquid 1 Application(s) Topical <User Schedule>  dexAMETHasone  Injectable 6 milliGRAM(s) IV Push daily  dextrose 40% Gel 15 Gram(s) Oral once  dextrose 5%. 1000 milliLiter(s) (50 mL/Hr) IV Continuous <Continuous>  dextrose 5%. 1000 milliLiter(s) (100 mL/Hr) IV Continuous <Continuous>  dextrose 50% Injectable 25 Gram(s) IV Push once  dextrose 50% Injectable 12.5 Gram(s) IV Push once  dextrose 50% Injectable 25 Gram(s) IV Push once  enoxaparin Injectable 90 milliGRAM(s) SubCutaneous two times a day  gabapentin 300 milliGRAM(s) Oral at bedtime  glucagon  Injectable 1 milliGRAM(s) IntraMuscular once  insulin glargine Injectable (LANTUS) 35 Unit(s) SubCutaneous at bedtime  insulin lispro (ADMELOG) corrective regimen sliding scale   SubCutaneous three times a day before meals  insulin lispro Injectable (ADMELOG) 11 Unit(s) SubCutaneous three times a day before meals  metoprolol succinate ER 50 milliGRAM(s) Oral daily  pantoprazole    Tablet 40 milliGRAM(s) Oral before breakfast    MEDICATIONS  (PRN):      LABS                          13.0   12.58 )-----------( 265      ( 20 Sep 2021 08:35 )             39.9     09-20    138  |  103  |  39<H>  ----------------------------<  254<H>  5.0   |  17  |  1.1    Ca    8.7      20 Sep 2021 08:35  Mg     2.3     09-20    TPro  7.5  /  Alb  3.1<L>  /  TBili  0.9  /  DBili  x   /  AST  56<H>  /  ALT  66<H>  /  AlkPhos  135<H>  09-20          Lactate Trend    CARDIAC MARKERS ( 18 Sep 2021 16:05 )  x     / <0.01 ng/mL / x     / x     / x          CAPILLARY BLOOD GLUCOSE      POCT Blood Glucose.: 433 mg/dL (20 Sep 2021 11:28)      Culture - Urine (collected 09-17-21 @ 17:01)  Source: Clean Catch Clean Catch (Midstream)  Final Report (09-19-21 @ 04:50):    <10,000 CFU/mL Normal Urogenital Emilee        RADIOLOGY & ADDITIONAL TESTS:    Imaging Personally Reviewed:  [ ] YES  [ ] NO    HEALTH ISSUES - PROBLEM Dx:  Pulmonary thromboembolism    Suspected deep vein thrombosis (DVT)            PHYSICAL EXAM:  GENERAL: mild respiratory distress.   HEAD:  Atraumatic, Normocephalic.  EYES: EOMI, PERRLA, conjunctiva and sclera clear.  NECK: Supple, No JVD.  CHEST/LUNG: Right lower lung rales.   HEART: Regular rate and rhythm; S1 S2.   ABDOMEN: Soft, Nontender, Nondistended; Bowel sounds present.  EXTREMITIES:  2+ Peripheral Pulses, No clubbing, cyanosis, or edema.  PSYCH: AAOx3.  NEUROLOGY: non-focal.  SKIN: No rashes or lesions.

## 2021-09-20 NOTE — PROGRESS NOTE ADULT - ASSESSMENT
Mr Contreras is a 81 year old Man with Medical Hx of CAD s/p CABG, DM, HTN, DLD presented to the ED for Shortness of breath of 2 weeks duration.    #Shortness of breath   #Hemoptysis   #COVID 19 PNA   #Bilateral Pulmonary Emboli  Patient symptoms started around 2 weeks ago, Patient tested positive 09/03/2021  On admission he was hypoxic with SpO2 at rest 88%, improved to 95% on 4 L NC.   CTA was performed on admission and it showed Bilateral pulmonary artery emboli extending into segmental and subsegmental branches and involving all lobes as above. Start lovenox for now ( Patient has normal kidney function. However, given the hemoptysis and the patient description of the amount of blood, would start heparin for now and watch for bleed and if no bleed, would consider changing him to Lovenox/NOAC )  VA duplex of the LE bilaterally neg  ID recs appreciated  c/w Decadron PO 6 mg For 10 days    c/w Lovenox 90 BID - Hb stable for now. May change to PO a/c tomorrow.     #CAD s/p CABG   c/w Aspirin and Toprol 50mg PO daily     #DLD  c/w lipitor 40 mg PO daily     #Insulin dependent DM   Patient is on insulin and Metformin at home   Hold metformin  c/w insulin 25/8/8/8 with sliding scale, follow FS    #Mild troponin elevation   On admission troponin was 0.03, stable from one week ago   Patient does not have any chest pain, can be related to his PE or his infection   EKG did not show any acute ischemic changes   f/u Repeat Troponin    #Mild transaminitis   Can be related to COVID. However, Bilirubin is slightly elevated as well   Patient denied any RUQ abdominal pain, check RUQ US if trending up   Daily CMP for now     #Pseudohyponatremia   On admission Na was 133, Glu was 300s, corrected > 135.         DVT ppx: ther. lovenox.   GI ppx: protonix     Progress Note Handoff:  Pending: clinical improvement   Dispo: Tele, Acute   May be able to d/c home w/ home O2 tomorrow and oral steroids, anticoagulant. check walking sats today. anticipate       Mr Contreras is a 81 year old Man with Medical Hx of CAD s/p CABG, DM, HTN, DLD presented to the ED for Shortness of breath of 2 weeks duration.    # Bilateral Pulmonary Emboli iso s/p covid 19 - active  - covid positive 09/03/2021  - On admission he was hypoxic with SpO2 at rest 88%, improved to 95% on 4 L NC.   - CTA  Bilateral pulmonary artery emboli extending into segmental and subsegmental branches and involving all lobes   - Start lovenox for now ( Patient has normal kidney function. However, given the hemoptysis and the patient description of the amount of blood, would start heparin for now and watch for bleed and if no bleed, would consider changing him to Lovenox/NOAC )  - VA duplex of the LE bilaterally neg  - ID recs appreciated  - c/w Decadron PO 6 mg For 10 days    - c/w Lovenox 90 BID - Hb stable for now.   - d/c on xarelto if priced     #Insulin dependent DM - active  - increased insulin 35/11/11/11 with sliding scale  - follow FS    #CAD s/p CABG  - stable  c/w Aspirin and Toprol 50mg PO daily     #DLD - stable  - c/w lipitor 40 mg PO daily     # ? demand ischemia w/ troponemia - resolved  - On admission troponin was 0.03, stable from one week ago   - Patient does not have any chest pain, can be related to his PE or his infection   - EKG did not show any acute ischemic changes   - repeat troponin <0.1 9/20    #Mild transaminitis - resolved    #Pseudohyponatremia - resolved  On admission Na was 133, Glu was 300s, corrected > 135.     DVT ppx: ther. lovenox.   GI ppx: protonix     Progress Note Handoff:  Pending: clinical improvement   Dispo: Tele, Acute   May be able to d/c home w/ home O2 tomorrow and oral steroids, anticoagulant. check walking sats today.

## 2021-09-20 NOTE — PROVIDER CONTACT NOTE (CRITICAL VALUE NOTIFICATION) - RECOMMENDATIONS
Per Md Staples #[id number] pt to receive standing and SS insulin as ordered. RN will recheck insulin after lunch to determine if added insulin administration is required. Will report back to MD.

## 2021-09-21 NOTE — PHYSICAL THERAPY INITIAL EVALUATION ADULT - PERTINENT HX OF CURRENT PROBLEM, REHAB EVAL
This is a 81 year old M patient with PMHx of CAD s/p CABG, DM, HTN, DLD presented to the ED for Shortness of breath of 2 weeks duration. Patient was doing relatively well until around 2 weeks prior to admission when he started experiencing shortness of breath. Patient started experiencing shortness of breath by the beginning of september and he got tested for COVID 09/03/2021 and it was positive. Since then the patient has been experiencing progressive dyspnea on exertion

## 2021-09-21 NOTE — PROGRESS NOTE ADULT - SUBJECTIVE AND OBJECTIVE BOX
LENGTH OF HOSPITAL STAY: 3d    CHIEF COMPLAINT:    Patient is a 81y old  Male who presents with a chief complaint of Hemoptysis, COVID 19 PNA (19 Sep 2021 16:02)    OVERNIGHT EVENTS:    None    FOLLOW UP:    - xarelto cost at vivo; ambulatory O2    HOSPITAL COURSE:        HPI:    HPI:  This is a 81 year old M patient with PMHx of CAD s/p CABG, DM, HTN, DLD presented to the ED for Shortness of breath of 2 weeks duration. Patient was doing relatively well until around 2 weeks prior to admission when he started experiencing shortness of breath. Patient started experiencing shortness of breath by the beginning of september and he got tested for COVID 09/03/2021 and it was positive. Since then the patient has been experiencing progressive dyspnea on exertion ( now occur at rest ). His SOB was associated with cough that was productive of clear sputum until one week prior to admission when he started noticing blood in his sputum. He stated that it only happens in the morning when he wakes up and it is at least ( 2 tablespoons ) in quantity. Patient denied any fevers but mentioned daily chills and headache. besides that patient denied chest pain, palpitation, nausea, vomiting or any other symptoms.     Off note: patient stated that he received 2 doses of pfizer vaccine.     In the ED Vital Signs Last 24 Hrs  T(C): 35.6 (17 Sep 2021 15:12), Max: 38.2 (17 Sep 2021 13:05)  T(F): 96.1 (17 Sep 2021 15:12), Max: 100.7 (17 Sep 2021 13:05)  HR: 95 (17 Sep 2021 17:53) (87 - 105)  BP: 141/72 (17 Sep 2021 17:53) (141/72 - 159/72)  BP(mean): --  RR: 16 (17 Sep 2021 17:53) (16 - 20)  SpO2: 96% (17 Sep 2021 17:53) (94% - 97%) (17 Sep 2021 17:56)      ALLERGIES:    No Known Allergies    PMHx:    Heart attack; Diabetes; Hypertension; Atherosclerotic heart disease; Hyperlipidemia; S/P CABG x 1; 21 years ago; H/O neck surgery 2017; Previous back surgery 18 yrs ago    SOCIAL Hx:    from home    MEDICATIONS:  STANDING MEDICATIONS  aspirin enteric coated 81 milliGRAM(s) Oral daily  atorvastatin Oral Tab/Cap - Peds 40 milliGRAM(s) Oral daily  chlorhexidine 4% Liquid 1 Application(s) Topical <User Schedule>  dexAMETHasone  Injectable 6 milliGRAM(s) IV Push daily  dextrose 40% Gel 15 Gram(s) Oral once  dextrose 5%. 1000 milliLiter(s) IV Continuous <Continuous>  dextrose 5%. 1000 milliLiter(s) IV Continuous <Continuous>  dextrose 50% Injectable 25 Gram(s) IV Push once  dextrose 50% Injectable 12.5 Gram(s) IV Push once  dextrose 50% Injectable 25 Gram(s) IV Push once  gabapentin 300 milliGRAM(s) Oral at bedtime  glucagon  Injectable 1 milliGRAM(s) IntraMuscular once  insulin glargine Injectable (LANTUS) 35 Unit(s) SubCutaneous at bedtime  insulin lispro (ADMELOG) corrective regimen sliding scale   SubCutaneous three times a day before meals  insulin lispro Injectable (ADMELOG) 11 Unit(s) SubCutaneous three times a day before meals  metoprolol succinate ER 50 milliGRAM(s) Oral daily  pantoprazole    Tablet 40 milliGRAM(s) Oral before breakfast  rivaroxaban 15 milliGRAM(s) Oral two times a day with meals    PRN MEDICATIONS      LABS:                        13.0   12.58 )-----------( 265      ( 20 Sep 2021 08:35 )             39.9     09-20    138  |  103  |  39<H>  ----------------------------<  254<H>  5.0   |  17  |  1.1    Ca    8.7      20 Sep 2021 08:35  Mg     2.3     09-20    TPro  7.5  /  Alb  3.1<L>  /  TBili  0.9  /  DBili  x   /  AST  56<H>  /  ALT  66<H>  /  AlkPhos  135<H>  09-20      Culture - Urine (collected 17 Sep 2021 17:01)  Source: Clean Catch Clean Catch (Midstream)  Final Report (19 Sep 2021 04:50):    <10,000 CFU/mL Normal Urogenital Emilee      VITALS:   T(F): 96  HR: 88  BP: 156/90  RR: 21  SpO2: 88%        PHYSICAL EXAM:    Gen: NAD, resting in bed  HEENT: Normocephalic, atraumatic  Neck: supple, no lymphadenopathy  CV: Regular rate & regular rhythm  Lungs: decreased BS at bases, no fremitus, tach/dyspneic   Abdomen: Soft, BS present  Ext: Warm, well perfused  Neuro: non focal, awake  Skin: no rash, no erythema   LENGTH OF HOSPITAL STAY: 3d    CHIEF COMPLAINT:    Patient is a 81y old  Male who presents with a chief complaint of Hemoptysis, COVID 19 PNA (19 Sep 2021 16:02)    OVERNIGHT EVENTS:    None    FOLLOW UP:    - xarelto cost at vivo; ambulatory O2    HOSPITAL COURSE:    Pt found to have bilateral PE, managed on therapeutic lovenox. Supportive care on 6mg decadron for covid, with oxygen. Pt still desatting at room air and with ambulation, will need O2 on d/c. Will be d/c'd on prednosone 40mg for 10 days and home O2, will be switched to xarelto. Pt DM managed with ISS. HTN, HLD and CAD hx managed w/ home meds and supportive care.     HPI:    HPI:  This is a 81 year old M patient with PMHx of CAD s/p CABG, DM, HTN, DLD presented to the ED for Shortness of breath of 2 weeks duration. Patient was doing relatively well until around 2 weeks prior to admission when he started experiencing shortness of breath. Patient started experiencing shortness of breath by the beginning of september and he got tested for COVID 09/03/2021 and it was positive. Since then the patient has been experiencing progressive dyspnea on exertion ( now occur at rest ). His SOB was associated with cough that was productive of clear sputum until one week prior to admission when he started noticing blood in his sputum. He stated that it only happens in the morning when he wakes up and it is at least ( 2 tablespoons ) in quantity. Patient denied any fevers but mentioned daily chills and headache. besides that patient denied chest pain, palpitation, nausea, vomiting or any other symptoms.     Off note: patient stated that he received 2 doses of pfizer vaccine.     In the ED Vital Signs Last 24 Hrs  T(C): 35.6 (17 Sep 2021 15:12), Max: 38.2 (17 Sep 2021 13:05)  T(F): 96.1 (17 Sep 2021 15:12), Max: 100.7 (17 Sep 2021 13:05)  HR: 95 (17 Sep 2021 17:53) (87 - 105)  BP: 141/72 (17 Sep 2021 17:53) (141/72 - 159/72)  BP(mean): --  RR: 16 (17 Sep 2021 17:53) (16 - 20)  SpO2: 96% (17 Sep 2021 17:53) (94% - 97%) (17 Sep 2021 17:56)      ALLERGIES:    No Known Allergies    PMHx:    Heart attack; Diabetes; Hypertension; Atherosclerotic heart disease; Hyperlipidemia; S/P CABG x 1; 21 years ago; H/O neck surgery 2017; Previous back surgery 18 yrs ago    SOCIAL Hx:    from home    MEDICATIONS:  STANDING MEDICATIONS  aspirin enteric coated 81 milliGRAM(s) Oral daily  atorvastatin Oral Tab/Cap - Peds 40 milliGRAM(s) Oral daily  chlorhexidine 4% Liquid 1 Application(s) Topical <User Schedule>  dexAMETHasone  Injectable 6 milliGRAM(s) IV Push daily  dextrose 40% Gel 15 Gram(s) Oral once  dextrose 5%. 1000 milliLiter(s) IV Continuous <Continuous>  dextrose 5%. 1000 milliLiter(s) IV Continuous <Continuous>  dextrose 50% Injectable 25 Gram(s) IV Push once  dextrose 50% Injectable 12.5 Gram(s) IV Push once  dextrose 50% Injectable 25 Gram(s) IV Push once  gabapentin 300 milliGRAM(s) Oral at bedtime  glucagon  Injectable 1 milliGRAM(s) IntraMuscular once  insulin glargine Injectable (LANTUS) 35 Unit(s) SubCutaneous at bedtime  insulin lispro (ADMELOG) corrective regimen sliding scale   SubCutaneous three times a day before meals  insulin lispro Injectable (ADMELOG) 11 Unit(s) SubCutaneous three times a day before meals  metoprolol succinate ER 50 milliGRAM(s) Oral daily  pantoprazole    Tablet 40 milliGRAM(s) Oral before breakfast  rivaroxaban 15 milliGRAM(s) Oral two times a day with meals    PRN MEDICATIONS      LABS:                        11.5   8.97  )-----------( 290      ( 21 Sep 2021 06:39 )             35.3     09-21    136  |  102  |  53<H>  ----------------------------<  190<H>  5.0   |  22  |  1.0    Ca    8.3<L>      21 Sep 2021 06:39  Mg     2.3     09-20    TPro  7.5  /  Alb  3.1<L>  /  TBili  0.9  /  DBili  x   /  AST  56<H>  /  ALT  66<H>  /  AlkPhos  135<H>  09-20        VITALS:   T(F): 96  HR: 87  BP: 130/70  RR: 18  SpO2: 95%          PHYSICAL EXAM:    Gen: NAD, resting in bed  HEENT: Normocephalic, atraumatic  Neck: supple, no lymphadenopathy  CV: Regular rate & regular rhythm  Lungs: decreased BS at bases, no fremitus, tach/dyspneic   Abdomen: Soft, BS present  Ext: Warm, well perfused  Neuro: non focal, awake  Skin: no rash, no erythema

## 2021-09-21 NOTE — PROGRESS NOTE ADULT - ASSESSMENT
Mr Contreras is a 81 year old Man with Medical Hx of CAD s/p CABG, DM, HTN, DLD presented to the ED for Shortness of breath of 2 weeks duration.    # Bilateral Pulmonary Emboli iso s/p covid 19 - active  - covid positive 09/03/2021  - On admission he was hypoxic with SpO2 at rest 88%, improved to 95% on 4 L NC.   - CTA  Bilateral pulmonary artery emboli extending into segmental and subsegmental branches and involving all lobes   - Start lovenox for now ( Patient has normal kidney function. However, given the hemoptysis and the patient description of the amount of blood, would start heparin for now and watch for bleed and if no bleed, would consider changing him to Lovenox/NOAC )  - VA duplex of the LE bilaterally neg  - ID recs appreciated  - c/w Decadron PO 6 mg For 10 days    - c/w Lovenox 90 BID - Hb stable for now.   - d/c on xarelto if priced     # needs O2 on  discharge  Despite treatment with steroids patient is still hypoxic.  <88% RA  <88% RA ambulation  %O2 ambulation  Patient tested in chronic stable state  patient aware going home on oxygen      #Insulin dependent DM - active  - increased insulin 35/11/11/11 with sliding scale  - follow FS    #CAD s/p CABG  - stable  c/w Aspirin and Toprol 50mg PO daily     #DLD - stable  - c/w lipitor 40 mg PO daily     # ? demand ischemia w/ troponemia - resolved  - On admission troponin was 0.03, stable from one week ago   - Patient does not have any chest pain, can be related to his PE or his infection   - EKG did not show any acute ischemic changes   - repeat troponin <0.1 9/20    #Mild transaminitis - resolved    #Pseudohyponatremia - resolved  On admission Na was 133, Glu was 300s, corrected > 135.     DVT ppx: ther. lovenox.   GI ppx: protonix     Progress Note Handoff:  Pending: clinical improvement   Dispo: Tele, Acute   May be able to d/c home w/ home O2 tomorrow and oral steroids, anticoagulant. check walking sats today.        Mr Contreras is a 81 year old Man with Medical Hx of CAD s/p CABG, DM, HTN, DLD presented to the ED for Shortness of breath of 2 weeks duration.    # Bilateral Pulmonary Emboli iso s/p covid 19 - active  - covid positive 09/03/2021  - On admission he was hypoxic with SpO2 at rest 88%, improved to 95% on 4 L NC.   - CTA  Bilateral pulmonary artery emboli extending into segmental and subsegmental branches and involving all lobes   - Start lovenox for now ( Patient has normal kidney function. However, given the hemoptysis and the patient description of the amount of blood, would start heparin for now and watch for bleed and if no bleed, would consider changing him to Lovenox/NOAC )  - VA duplex of the LE bilaterally neg  - ID recs appreciated  - c/w Decadron PO 6 mg For 10 days    - c/w Lovenox 90 BID - Hb stable for now.   - d/c on xarelto if priced     # needs O2 on  discharge  - Despite treatment with steroids patient is still hypoxic.  <88% RA  <88% RA ambulation  %O2 ambulation  - Patient tested in chronic stable state  - patient aware going home on oxygen      #Insulin dependent DM - active  - increased insulin 35/11/11/11 with sliding scale  - follow FS    #CAD s/p CABG  - stable  c/w Aspirin and Toprol 50mg PO daily     #DLD - stable  - c/w lipitor 40 mg PO daily     # ? demand ischemia w/ troponemia - resolved  - On admission troponin was 0.03, stable from one week ago   - Patient does not have any chest pain, can be related to his PE or his infection   - EKG did not show any acute ischemic changes   - repeat troponin <0.1 9/20    #Mild transaminitis - resolved    #Pseudohyponatremia - resolved  On admission Na was 133, Glu was 300s, corrected > 135.     DVT ppx: ther. lovenox.   GI ppx: protonix     Progress Note Handoff:  Pending: clinical improvement   Dispo: Tele, Acute   May be able to d/c home w/ home O2 tomorrow and oral steroids, anticoagulant. check walking sats today.        Mr Contreras is a 81 year old Man with Medical Hx of CAD s/p CABG, DM, HTN, DLD presented to the ED for Shortness of breath of 2 weeks duration.    # Bilateral Pulmonary Emboli iso s/p covid 19 - active  - covid positive 09/03/2021  - On admission he was hypoxic with SpO2 at rest 88%, improved to 95% on 4 L NC.   - CTA  Bilateral pulmonary artery emboli extending into segmental and subsegmental branches and involving all lobes   - Start lovenox for now ( Patient has normal kidney function. However, given the hemoptysis and the patient description of the amount of blood, would start heparin for now and watch for bleed and if no bleed, would consider changing him to Lovenox/NOAC )  - VA duplex of the LE bilaterally neg  - ID recs appreciated  - c/w Decadron PO 6 mg For 10 days    - c/w Lovenox 90 BID - Hb stable for now.   - d/c on xarelto if priced     # needs O2 on  discharge  - Despite treatment with steroids patient is still hypoxic.  87% on RA  88% on RA ambulation  94% with 2L NC O2 ambulation  - Patient tested in chronic stable state  - patient aware going home on oxygen      #Insulin dependent DM - active  - increased insulin 35/11/11/11 with sliding scale  - follow FS    #CAD s/p CABG  - stable  c/w Aspirin and Toprol 50mg PO daily     #DLD - stable  - c/w lipitor 40 mg PO daily     # ? demand ischemia w/ troponemia - resolved  - On admission troponin was 0.03, stable from one week ago   - Patient does not have any chest pain, can be related to his PE or his infection   - EKG did not show any acute ischemic changes   - repeat troponin <0.1 9/20    #Mild transaminitis - resolved    #Pseudohyponatremia - resolved  On admission Na was 133, Glu was 300s, corrected > 135.     DVT ppx: ther. lovenox.   GI ppx: protonix     Progress Note Handoff:  Pending: clinical improvement   Dispo: Tele, Acute   May be able to d/c home w/ home O2 tomorrow and oral steroids, anticoagulant. check walking sats today.

## 2021-09-21 NOTE — DISCHARGE NOTE PROVIDER - NSDCCPCAREPLAN_GEN_ALL_CORE_FT
PRINCIPAL DISCHARGE DIAGNOSIS  Diagnosis: Pulmonary embolism  Assessment and Plan of Treatment: During this hospitalization, you were diagnosed with a pulmonary embolsim. In your case, you have a  deep vein thrombosis (DVT) which is a blood clot in a large vein deep in a leg, arm, or elsewhere in the body. The clot can separate from the vein, travel to the lungs and cut off blood flow. This is a pulmonary embolism (PE). Pulmonary embolism is very serious. Both the prevention and the treatment are similar for DVT and PE.   To help prevent more blood clots from forming, please see your primary care doctor within one week of discharge, and please take your medicines exactly as instructed. Don’t skip doses. You have been prescribed a medication to thin the blood, so that more clots do not form.  Have all lab tests as recommended. This is very important when you take medicines to prevent blood clots. Make sure you stay active and walk for at least 30 minutes every day. When sitting for long periods of time, move your knees, ankles, feet, and toes.    If you smoke, get help to quit. Stay at a healthy weight. Try to exercise at least 30 minutes on most days. Before starting an exercise program, talk with your primary care provider. When traveling by car, make frequent stops to get up and move around. On long airplane rides, get up and move around when possible. If you can’t get up, wiggle your toes, move your ankles and tighten your calves to keep your blood moving.  Seek immediate medical care if you have pain, swelling, and redness in your leg, arm, or other body area. These symptoms may mean another blood clot. Also call your healthcare provider if you have signs and symptoms of bleeding, like blood in your urine, bleeding with bowel movements, or bleeding from the nose, gums, a cut, or vagina. Call 911 if you have symptoms of a blood clot in the lungs including: Chest pain, trouble breathing, coughing blood, fast heartbeat, heavy or uncontrolled bleeding.        SECONDARY DISCHARGE DIAGNOSES  Diagnosis: Pneumonia due to COVID-19 virus  Assessment and Plan of Treatment: Coronavirus disease 2019 (COVID-19) is a respiratory illness  that can spread from person to person. The virus that causes  COVID-19 is a novel coronavirus that was first identified during  an investigation into an outbreak in [place], [place].  The virus that causes COVID-19 probably emerged from an  animal source, but is now spreading from person to person.  The virus is thought to spread mainly between people who  are in close contact with one another (within about 6 feet)  through respiratory droplets produced when an infected  person coughs or sneezes. It also may be possible that a person  can get COVID-19 by touching a surface or object that has  the virus on it and then touching their own mouth, nose, or  possibly their eyes, but this is not thought to be the main  way the virus spreads.  Please stay home and avoid contact with others for at least a week after symptoms resolve and follow government guidelines.   Patients with COVID-19 have had mild to severe respiratory  illness with symptoms of  • fever  • cough  • shortness of breath  People can help protect themselves from respiratory illness with  everyday preventive actions.    • Avoid close contact with people who are sick.  • Avoid touching your eyes, nose, and mouth with  unwashed hands.  • Wash your hands often with soap and water for at least 20   seconds. Use an alcohol-based hand  that contains at  least 60% alcohol if soap and water are not available.   Stay home when you are sick.  • Cover your cough or sneeze with a tissue, then throw the  tissue in the trash.  • Clean and disinfect frequently touched objects  and surfaces.  Call 911 and inform them you are covid positive before you decide to go to the emergency room if you have chest pain, difficulty breathing, high fevers, worsening of your symptoms, feel unwell, or have nausea and vomiting.

## 2021-09-21 NOTE — PHYSICAL THERAPY INITIAL EVALUATION ADULT - GENERAL OBSERVATIONS, REHAB EVAL
Patient encountered lying sitting on edge of bed on O2 via NC @ 2LPM, O2 sat 94%. Mild SOB since patient just came from bathroom. Agreed to participate in therapy

## 2021-09-21 NOTE — DISCHARGE NOTE NURSING/CASE MANAGEMENT/SOCIAL WORK - PATIENT PORTAL LINK FT
You can access the FollowMyHealth Patient Portal offered by [facility] by registering at the following website: [email or web address]. By joining [facility]’s FollowMyHealth portal, you will also be able to view your health information using other applications (apps) compatible with our system.

## 2021-09-21 NOTE — PROGRESS NOTE ADULT - SUBJECTIVE AND OBJECTIVE BOX
ROSALINA BLOOM  81y  Male      Patient is a 81y old  Male who presents with a chief complaint of Hemoptysis, COVID 19 PNA (20 Sep 2021 12:46)      INTERVAL HPI/OVERNIGHT EVENTS:  He feels better, still with cough, no bloody sputum, no fever.   Vital Signs Last 24 Hrs  T(C): 35.1 (21 Sep 2021 05:01), Max: 35.6 (20 Sep 2021 13:58)  T(F): 95.2 (21 Sep 2021 05:01), Max: 96 (20 Sep 2021 13:58)  HR: 85 (21 Sep 2021 05:01) (85 - 88)  BP: 148/88 (21 Sep 2021 05:01) (148/88 - 156/90)  BP(mean): --  RR: 19 (21 Sep 2021 08:15) (18 - 21)  SpO2: 95% (21 Sep 2021 08:15) (87% - 95%)      09-20-21 @ 07:01  -  09-21-21 @ 07:00  --------------------------------------------------------  IN: 690 mL / OUT: 850 mL / NET: -160 mL            Consultant(s) Notes Reviewed:  [x ] YES  [ ] NO          MEDICATIONS  (STANDING):  aspirin enteric coated 81 milliGRAM(s) Oral daily  atorvastatin Oral Tab/Cap - Peds 40 milliGRAM(s) Oral daily  chlorhexidine 4% Liquid 1 Application(s) Topical <User Schedule>  dexAMETHasone  Injectable 6 milliGRAM(s) IV Push daily  dextrose 40% Gel 15 Gram(s) Oral once  dextrose 5%. 1000 milliLiter(s) (50 mL/Hr) IV Continuous <Continuous>  dextrose 5%. 1000 milliLiter(s) (100 mL/Hr) IV Continuous <Continuous>  dextrose 50% Injectable 25 Gram(s) IV Push once  dextrose 50% Injectable 12.5 Gram(s) IV Push once  dextrose 50% Injectable 25 Gram(s) IV Push once  gabapentin 300 milliGRAM(s) Oral at bedtime  glucagon  Injectable 1 milliGRAM(s) IntraMuscular once  insulin glargine Injectable (LANTUS) 35 Unit(s) SubCutaneous at bedtime  insulin lispro (ADMELOG) corrective regimen sliding scale   SubCutaneous three times a day before meals  insulin lispro Injectable (ADMELOG) 11 Unit(s) SubCutaneous three times a day before meals  metoprolol succinate ER 50 milliGRAM(s) Oral daily  pantoprazole    Tablet 40 milliGRAM(s) Oral before breakfast  rivaroxaban 15 milliGRAM(s) Oral two times a day with meals    MEDICATIONS  (PRN):      LABS                          11.5   8.97  )-----------( 290      ( 21 Sep 2021 06:39 )             35.3     09-21    136  |  102  |  53<H>  ----------------------------<  190<H>  5.0   |  22  |  1.0    Ca    8.3<L>      21 Sep 2021 06:39  Mg     2.3     09-20    TPro  7.5  /  Alb  3.1<L>  /  TBili  0.9  /  DBili  x   /  AST  56<H>  /  ALT  66<H>  /  AlkPhos  135<H>  09-20          Lactate Trend        CAPILLARY BLOOD GLUCOSE      POCT Blood Glucose.: 395 mg/dL (21 Sep 2021 11:22)      Culture - Urine (collected 09-17-21 @ 17:01)  Source: Clean Catch Clean Catch (Midstream)  Final Report (09-19-21 @ 04:50):    <10,000 CFU/mL Normal Urogenital Emilee        RADIOLOGY & ADDITIONAL TESTS:    Imaging Personally Reviewed:  [ ] YES  [ ] NO    HEALTH ISSUES - PROBLEM Dx:  Pulmonary thromboembolism    Suspected deep vein thrombosis (DVT)            PHYSICAL EXAM:  GENERAL: no respiratory disress.   HEAD:  Atraumatic, Normocephalic.  EYES: EOMI, PERRLA, conjunctiva and sclera clear.  NECK: Supple, No JVD.  CHEST/LUNG: Right lower lung rales.   HEART: Regular rate and rhythm; S1 S2.   ABDOMEN: Soft, Nontender, Nondistended; Bowel sounds present.  EXTREMITIES:  2+ Peripheral Pulses, No clubbing, cyanosis, or edema.  PSYCH: AAOx3.  NEUROLOGY: non-focal.  SKIN: No rashes or lesions.

## 2021-09-21 NOTE — DISCHARGE NOTE PROVIDER - HOSPITAL COURSE
HOSPITAL COURSE:    Pt found to have bilateral PE, managed on therapeutic lovenox. Supportive care on 6mg decadron for covid, with oxygen. Pt still desatting at room air and with ambulation, will need O2 on d/c. Will be d/c'd on prednosone 40mg for 10 days and home O2, will be switched to xarelto. Pt DM managed with ISS. HTN, HLD and CAD hx managed w/ home meds and supportive care.       HPI:    HPI:  This is a 81 year old M patient with PMHx of CAD s/p CABG, DM, HTN, DLD presented to the ED for Shortness of breath of 2 weeks duration. Patient was doing relatively well until around 2 weeks prior to admission when he started experiencing shortness of breath. Patient started experiencing shortness of breath by the beginning of september and he got tested for COVID 09/03/2021 and it was positive. Since then the patient has been experiencing progressive dyspnea on exertion ( now occur at rest ). His SOB was associated with cough that was productive of clear sputum until one week prior to admission when he started noticing blood in his sputum. He stated that it only happens in the morning when he wakes up and it is at least ( 2 tablespoons ) in quantity. Patient denied any fevers but mentioned daily chills and headache. besides that patient denied chest pain, palpitation, nausea, vomiting or any other symptoms.     Off note: patient stated that he received 2 doses of pfizer vaccine.     In the ED Vital Signs Last 24 Hrs  T(C): 35.6 (17 Sep 2021 15:12), Max: 38.2 (17 Sep 2021 13:05)  T(F): 96.1 (17 Sep 2021 15:12), Max: 100.7 (17 Sep 2021 13:05)  HR: 95 (17 Sep 2021 17:53) (87 - 105)  BP: 141/72 (17 Sep 2021 17:53) (141/72 - 159/72)  BP(mean): --  RR: 16 (17 Sep 2021 17:53) (16 - 20)  SpO2: 96% (17 Sep 2021 17:53) (94% - 97%) (17 Sep 2021 17:56)       HOSPITAL COURSE:    Pt found to have bilateral PE, managed on therapeutic lovenox. Supportive care on 6mg decadron for covid, with oxygen. Pt still desatting at room air and with ambulation, will need O2 on d/c. Will be d/c'd on prednosone 40mg for 10 days and home O2, will be switched to xarelto. Pt DM managed with ISS. HTN, HLD and CAD hx managed w/ home meds and supportive care.       HPI:    HPI:  This is a 81 year old M patient with PMHx of CAD s/p CABG, DM, HTN, DLD presented to the ED for Shortness of breath of 2 weeks duration. Patient was doing relatively well until around 2 weeks prior to admission when he started experiencing shortness of breath. Patient started experiencing shortness of breath by the beginning of september and he got tested for COVID 09/03/2021 and it was positive. Since then the patient has been experiencing progressive dyspnea on exertion ( now occur at rest ). His SOB was associated with cough that was productive of clear sputum until one week prior to admission when he started noticing blood in his sputum. He stated that it only happens in the morning when he wakes up and it is at least ( 2 tablespoons ) in quantity. Patient denied any fevers but mentioned daily chills and headache. besides that patient denied chest pain, palpitation, nausea, vomiting or any other symptoms.     A/P:   Acute Hypoxic Respiratory failure:   COVID-19 Pneumonia: Severe.   Acute Bilateral Pulmonary Emboli:   COVID positive since 9/3/21  Still Hypoxic on 2-3L NC.   CT chest showed Bilateral pulmonary artery emboli extending into segmental and subsegmental branches and involving all lobes. Bilateral, peripheral predominant, groundglass opacities.  Venous Duplex of LE negative.   Procalcitonin 0.23, Ferritin 669,     Continue Dexamethasone 6mg IV daily, discharge home on Prednisone 40mg Po daily to complete 10 days.   Continue Xarelto 15mg BID for 21 days then 20mg daily for 3-6 months.     CAD s/p CABG   Troponin now negative 0.01, no chest pain.   Continue Aspirin, Metoprolol and Lipitor.     DM type 2:  FS is elevated, likely from IV steroid.       Mild transaminitis   Likely form COVID-19 infection.

## 2021-09-21 NOTE — PROGRESS NOTE ADULT - ASSESSMENT
81 year old Man with Medical Hx of CAD s/p CABG, DM, HTN, DLD presented to the ED for Shortness of breath of 2 weeks duration.    A/P:   Acute Hypoxic Respiratory failure:   COVID-19 Pneumonia: Severe.   Acute Bilateral Pulmonary Emboli:   COVID positive since 9/3/21  Still Hypoxic on 2-3L NC.   CT chest showed Bilateral pulmonary artery emboli extending into segmental and subsegmental branches and involving all lobes. Bilateral, peripheral predominant, groundglass opacities.  Venous Duplex of LE negative.   Procalcitonin 0.23, Ferritin 669,     Continue Dexamethasone 6mg IV daily, discharge home on Prednisone 40mg Po daily to complete 10 days.   Continue Xarelto 15mg BID for 21 days then 20mg daily for 3-6 months.     CAD s/p CABG   Troponin now negative 0.01, no chest pain.   Continue Aspirin, Metoprolol and Lipitor.     DM type 2:  FS is elevated, likely from IV steroid.   Increase Lantus from 25 to 35 untis and Humalog from 8 to 11 units 3 times daily before meals.   Send A1C.     Mild transaminitis   Likely form COVID-19 infection.     DVT Prophylaxis: Lovenox therapeutic.   GI Prophylaxis: Protonix.     Progress Note Handoff:  Pending (specify):     Family discussion:  Disposition: Home with home O2 today.

## 2021-09-21 NOTE — DISCHARGE NOTE PROVIDER - NSDCMRMEDTOKEN_GEN_ALL_CORE_FT
aspirin 81 mg oral delayed release tablet: 1 tab(s) orally once a day  atorvastatin 40 mg oral tablet: 1 tab(s) orally once a day  Fortamet 500 mg oral tablet, extended release: 1 tab(s) orally once a day  gabapentin 300 mg oral capsule: 1 cap(s) orally once a day (at bedtime)  HumaLOG Mix 75/25 subcutaneous suspension:  subcutaneous   Metoprolol Succinate ER 50 mg oral tablet, extended release: 1 tab(s) orally once a day  omeprazole 20 mg oral delayed release capsule: 1 cap(s) orally once a day  predniSONE 20 mg oral tablet: 2 tab(s) orally once a day   Xarelto Starter Pack 15 mg-20 mg oral kit: Take as directed

## 2021-09-21 NOTE — DISCHARGE NOTE PROVIDER - CARE PROVIDER_API CALL
[address] [address]  Phone: [phone]  Fax: [phone]  Follow Up Time: 2 weeks

## 2021-09-22 NOTE — PROGRESS NOTE ADULT - ASSESSMENT
Mr Contreras is a 81 year old Man with Medical Hx of CAD s/p CABG, DM, HTN, DLD presented to the ED for Shortness of breath of 2 weeks duration.    # Bilateral Pulmonary Emboli iso s/p covid 19 - active  - covid positive 09/03/2021  - On admission he was hypoxic with SpO2 at rest 88%, improved to 95% on 4 L NC.   - CTA  Bilateral pulmonary artery emboli extending into segmental and subsegmental branches and involving all lobes   - Start lovenox for now ( Patient has normal kidney function. However, given the hemoptysis and the patient description of the amount of blood, would start heparin for now and watch for bleed and if no bleed, would consider changing him to Lovenox/NOAC )  - VA duplex of the LE bilaterally neg  - ID recs appreciated  - c/w Decadron PO 6 mg For 10 days    - c/w Lovenox 90 BID - Hb stable for now.   - d/c on xarelto, wife has already picked up Rx  - d/c on prednisone 40 6 days, wife has already picked up Rx    # needs O2 on  discharge  - Despite treatment with steroids patient is still hypoxic.  87% on RA  88% on RA ambulation  94% with 2L NC O2 ambulation  - Patient tested in chronic stable state  - patient aware going home on oxygen      #Insulin dependent DM - active  - increased insulin 35/11/11/11 with sliding scale  - follow FS    #CAD s/p CABG  - stable  c/w Aspirin and Toprol 50mg PO daily     #DLD - stable  - c/w lipitor 40 mg PO daily     # ? demand ischemia w/ troponemia - resolved  - On admission troponin was 0.03, stable from one week ago   - Patient does not have any chest pain, can be related to his PE or his infection   - EKG did not show any acute ischemic changes   - repeat troponin <0.1 9/20    #Mild transaminitis - resolved    #Pseudohyponatremia - resolved  On admission Na was 133, Glu was 300s, corrected > 135.     DVT ppx: ther. lovenox.   GI ppx: protonix     Progress Note Handoff: pending d/c  Pending: d/c planning - O2 delivery   Dispo: home w/ O2 and steroids

## 2021-09-22 NOTE — PROGRESS NOTE ADULT - SUBJECTIVE AND OBJECTIVE BOX
LENGTH OF HOSPITAL STAY: 5d      CHIEF COMPLAINT:    Patient is a 81y old  Male who presents with a chief complaint of Hemoptysis, COVID 19 PNA (19 Sep 2021 16:02)    OVERNIGHT EVENTS:    - FS > 300    FOLLOW UP:    - d/c home w/ O2    HOSPITAL COURSE:    Pt found to have bilateral PE, managed on therapeutic lovenox. Supportive care on 6mg decadron for covid, with oxygen. Pt still desatting at room air and with ambulation, will need O2 on d/c. Will be d/c'd on prednosone 40mg for 10 days and home O2, will be switched to xarelto. Pt DM managed with ISS, required increase 2/2 steroids. HTN, HLD and CAD hx managed w/ home meds and supportive care.     HPI:    This is a 81 year old M patient with PMHx of CAD s/p CABG, DM, HTN, DLD presented to the ED for Shortness of breath of 2 weeks duration. Patient was doing relatively well until around 2 weeks prior to admission when he started experiencing shortness of breath. Patient started experiencing shortness of breath by the beginning of september and he got tested for COVID 09/03/2021 and it was positive. Since then the patient has been experiencing progressive dyspnea on exertion ( now occur at rest ). His SOB was associated with cough that was productive of clear sputum until one week prior to admission when he started noticing blood in his sputum. He stated that it only happens in the morning when he wakes up and it is at least ( 2 tablespoons ) in quantity. Patient denied any fevers but mentioned daily chills and headache. besides that patient denied chest pain, palpitation, nausea, vomiting or any other symptoms.     Off note: patient stated that he received 2 doses of pfizer vaccine.     In the ED Vital Signs Last 24 Hrs  T(C): 35.6 (17 Sep 2021 15:12), Max: 38.2 (17 Sep 2021 13:05)  T(F): 96.1 (17 Sep 2021 15:12), Max: 100.7 (17 Sep 2021 13:05)  HR: 95 (17 Sep 2021 17:53) (87 - 105)  BP: 141/72 (17 Sep 2021 17:53) (141/72 - 159/72)  BP(mean): --  RR: 16 (17 Sep 2021 17:53) (16 - 20)  SpO2: 96% (17 Sep 2021 17:53) (94% - 97%) (17 Sep 2021 17:56)      ALLERGIES:    No Known Allergies    PMHx:    Heart attack; Diabetes; Hypertension; Atherosclerotic heart disease; Hyperlipidemia; S/P CABG x 1; 21 years ago; H/O neck surgery 2017; Previous back surgery 18 yrs ago    SOCIAL Hx:    from home        PHYSICAL EXAM:    Gen: NAD, resting in bed  HEENT: Normocephalic, atraumatic  Neck: supple, no lymphadenopathy  CV: Regular rate & regular rhythm  Lungs: decreased BS at bases, no fremitus, tach/dyspneic   Abdomen: Soft, BS present  Ext: Warm, well perfused  Neuro: non focal, awake  Skin: no rash, no erythema   LENGTH OF HOSPITAL STAY: 5d      CHIEF COMPLAINT:    Patient is a 81y old  Male who presents with a chief complaint of Hemoptysis, COVID 19 PNA (19 Sep 2021 16:02)    OVERNIGHT EVENTS:    - FS > 300    FOLLOW UP:    - d/c home w/ O2    HOSPITAL COURSE:    Pt found to have bilateral PE, managed on therapeutic lovenox. Supportive care on 6mg decadron for covid, with oxygen. Pt still desatting at room air and with ambulation, will need O2 on d/c. Will be d/c'd on prednosone 40mg for 10 days and home O2, will be switched to xarelto. Pt DM managed with ISS, required increase 2/2 steroids. HTN, HLD and CAD hx managed w/ home meds and supportive care.     HPI:    This is a 81 year old M patient with PMHx of CAD s/p CABG, DM, HTN, DLD presented to the ED for Shortness of breath of 2 weeks duration. Patient was doing relatively well until around 2 weeks prior to admission when he started experiencing shortness of breath. Patient started experiencing shortness of breath by the beginning of september and he got tested for COVID 09/03/2021 and it was positive. Since then the patient has been experiencing progressive dyspnea on exertion ( now occur at rest ). His SOB was associated with cough that was productive of clear sputum until one week prior to admission when he started noticing blood in his sputum. He stated that it only happens in the morning when he wakes up and it is at least ( 2 tablespoons ) in quantity. Patient denied any fevers but mentioned daily chills and headache. besides that patient denied chest pain, palpitation, nausea, vomiting or any other symptoms.     Off note: patient stated that he received 2 doses of pfizer vaccine.     In the ED Vital Signs Last 24 Hrs  T(C): 35.6 (17 Sep 2021 15:12), Max: 38.2 (17 Sep 2021 13:05)  T(F): 96.1 (17 Sep 2021 15:12), Max: 100.7 (17 Sep 2021 13:05)  HR: 95 (17 Sep 2021 17:53) (87 - 105)  BP: 141/72 (17 Sep 2021 17:53) (141/72 - 159/72)  BP(mean): --  RR: 16 (17 Sep 2021 17:53) (16 - 20)  SpO2: 96% (17 Sep 2021 17:53) (94% - 97%) (17 Sep 2021 17:56)      ALLERGIES:    No Known Allergies    PMHx:    Heart attack; Diabetes; Hypertension; Atherosclerotic heart disease; Hyperlipidemia; S/P CABG x 1; 21 years ago; H/O neck surgery 2017; Previous back surgery 18 yrs ago    SOCIAL Hx:    from home    MEDICATIONS:  STANDING MEDICATIONS  aspirin enteric coated 81 milliGRAM(s) Oral daily  atorvastatin Oral Tab/Cap - Peds 40 milliGRAM(s) Oral daily  chlorhexidine 4% Liquid 1 Application(s) Topical <User Schedule>  dexAMETHasone  Injectable 6 milliGRAM(s) IV Push daily  dextrose 40% Gel 15 Gram(s) Oral once  dextrose 5%. 1000 milliLiter(s) IV Continuous <Continuous>  dextrose 5%. 1000 milliLiter(s) IV Continuous <Continuous>  dextrose 50% Injectable 25 Gram(s) IV Push once  dextrose 50% Injectable 25 Gram(s) IV Push once  dextrose 50% Injectable 12.5 Gram(s) IV Push once  gabapentin 300 milliGRAM(s) Oral at bedtime  glucagon  Injectable 1 milliGRAM(s) IntraMuscular once  insulin glargine Injectable (LANTUS) 35 Unit(s) SubCutaneous at bedtime  insulin lispro (ADMELOG) corrective regimen sliding scale   SubCutaneous three times a day before meals  insulin lispro Injectable (ADMELOG) 11 Unit(s) SubCutaneous three times a day before meals  metoprolol succinate ER 50 milliGRAM(s) Oral daily  pantoprazole    Tablet 40 milliGRAM(s) Oral before breakfast  rivaroxaban 15 milliGRAM(s) Oral two times a day with meals    PRN MEDICATIONS      LABS:                        11.5   8.97  )-----------( 290      ( 21 Sep 2021 06:39 )             35.3     09-21    136  |  102  |  53<H>  ----------------------------<  190<H>  5.0   |  22  |  1.0    Ca    8.3<L>      21 Sep 2021 06:39        VITALS:   T(F): 96.4  HR: 94  BP: 128/78  RR: 17  SpO2: 95%        PHYSICAL EXAM:    Gen: NAD, resting in bed  HEENT: Normocephalic, atraumatic  Neck: supple, no lymphadenopathy  CV: Regular rate & regular rhythm  Lungs: decreased BS at bases, no fremitus, tach/dyspneic   Abdomen: Soft, BS present  Ext: Warm, well perfused  Neuro: non focal, awake  Skin: no rash, no erythema

## 2021-09-22 NOTE — PROGRESS NOTE ADULT - PROVIDER SPECIALTY LIST ADULT
Hospitalist
Hospitalist
Internal Medicine
Hospitalist
Internal Medicine
Internal Medicine
Infectious Disease

## 2021-09-22 NOTE — PROGRESS NOTE ADULT - SUBJECTIVE AND OBJECTIVE BOX
CHIEF COMPLAINT:    Patient is a 81y old  Male who presents with a chief complaint of Hemoptysis, COVID 19 PNA     INTERVAL HPI/OVERNIGHT EVENTS:    Patient seen and examined at bedside. No acute overnight events occurred.    ROS: Denies SOB, chest pain. All other systems are negative.    Vital Signs:    T(F): 96 (09-22-21 @ 14:09), Max: 96.4 (09-22-21 @ 06:06)  HR: 111 (09-22-21 @ 14:09) (94 - 111)  BP: 133/64 (09-22-21 @ 14:09) (128/78 - 133/64)  RR: 18 (09-22-21 @ 14:09) (16 - 18)  SpO2: 95% (09-22-21 @ 06:06) (95% - 95%)  I&O's Summary    21 Sep 2021 07:01  -  22 Sep 2021 07:00  --------------------------------------------------------  IN: 360 mL / OUT: 0 mL / NET: 360 mL    22 Sep 2021 07:01  -  22 Sep 2021 15:23  --------------------------------------------------------  IN: 570 mL / OUT: 0 mL / NET: 570 mL      POCT Blood Glucose.: 367 mg/dL (22 Sep 2021 15:04)  POCT Blood Glucose.: 412 mg/dL (22 Sep 2021 13:49)  POCT Blood Glucose.: 451 mg/dL (22 Sep 2021 11:24)  POCT Blood Glucose.: 423 mg/dL (22 Sep 2021 08:56)  POCT Blood Glucose.: 425 mg/dL (22 Sep 2021 07:37)  POCT Blood Glucose.: 367 mg/dL (21 Sep 2021 21:08)  POCT Blood Glucose.: 333 mg/dL (21 Sep 2021 16:53)      PHYSICAL EXAM:  GENERAL:  NAD  SKIN: No rashes or lesions  HEENT: Atraumatic. Normocephalic. Anicteric  NECK:  No JVD.   PULMONARY: Clear to ausculation bilaterally. No wheezing. No rales  CVS: Normal S1, S2. Regular rate and rhythm. No murmurs.  ABDOMEN/GI: Soft, Nontender, Nondistended; Bowel sounds are present  EXTREMITIES:  No edema B/L LE.  NEUROLOGIC:  No motor deficit.  PSYCH: Alert & oriented x 3, normal affect      LABS:                        11.5   8.97  )-----------( 290      ( 21 Sep 2021 06:39 )             35.3     09-21    136  |  102  |  53<H>  ----------------------------<  190<H>  5.0   |  22  |  1.0    Ca    8.3<L>      21 Sep 2021 06:39        Serum Pro-Brain Natriuretic Peptide: 866 pg/mL (09-17-21 @ 10:36)          RADIOLOGY & ADDITIONAL TESTS:  Imaging or report Personally Reviewed:  [ ] YES  [ ] NO    Telemetry reviewed independently - tachycardia yesterday, no further events    Medications:  Standing  aspirin enteric coated 81 milliGRAM(s) Oral daily  atorvastatin Oral Tab/Cap - Peds 40 milliGRAM(s) Oral daily  chlorhexidine 4% Liquid 1 Application(s) Topical <User Schedule>  dextrose 40% Gel 15 Gram(s) Oral once  dextrose 5%. 1000 milliLiter(s) IV Continuous <Continuous>  dextrose 5%. 1000 milliLiter(s) IV Continuous <Continuous>  dextrose 50% Injectable 25 Gram(s) IV Push once  dextrose 50% Injectable 12.5 Gram(s) IV Push once  dextrose 50% Injectable 25 Gram(s) IV Push once  gabapentin 300 milliGRAM(s) Oral at bedtime  glucagon  Injectable 1 milliGRAM(s) IntraMuscular once  insulin glargine Injectable (LANTUS) 35 Unit(s) SubCutaneous at bedtime  insulin lispro (ADMELOG) corrective regimen sliding scale   SubCutaneous three times a day before meals  insulin lispro Injectable (ADMELOG) 11 Unit(s) SubCutaneous three times a day before meals  metoprolol succinate ER 50 milliGRAM(s) Oral daily  pantoprazole    Tablet 40 milliGRAM(s) Oral before breakfast  rivaroxaban 15 milliGRAM(s) Oral two times a day with meals    PRN Meds      Case discussed with resident  Care discussed with pt

## 2021-09-22 NOTE — PROGRESS NOTE ADULT - ASSESSMENT
80 yo M PMHx CAD s/p CABG, DM II, HTN, DLD presented to the ED for Shortness of breath of 2 weeks duration.    Acute Hypoxic Respiratory failure:   COVID-19 Pneumonia: Severe.   Acute Bilateral Pulmonary Emboli likely due to COVID -19 infection  - COVID positive since 9/3/21  - Still Hypoxic on 2-3L NC.   - CT chest showed Bilateral pulmonary artery emboli extending into segmental and subsegmental branches and involving all lobes. Bilateral, peripheral predominant, ground glass opacities.  - Venous Duplex of LE negative.   - Procalcitonin 0.23, Ferritin 669,     - change dexamethasone to prednisone  - c/w xarelto    CAD s/p CABG   - Troponin now negative 0.01, no chest pain.   - Continue Aspirin, Metoprolol and Lipitor.     DM II  Severe hypoglycemia  - FS almost 500-received several doses of IV insulin--decreased 367  - check BMP for bicarb  - FS is elevated, likely from IV steroid.   - Increase Lantus from 25 to 35 units and Humalog from 8 to 11 units 3 times daily before meals.     Mild transaminitis   - Likely form COVID-19 infection.     DVT Prophylaxis: Lovenox therapeutic.   GI Prophylaxis: Protonix.     Progress Note Handoff:  Pending (specify):     Family discussion: Called pt's daughter Amanda-left message for call back -- i explained to pt with his FS being so elevated if it goes untreated he is at risk to have a coma or even death. He reports saying that it's not true. I explained he can AMA if his family is agreeable and teach back my concerns. I called pt's daughter but there was no answer. Pt is anticipated for tomorrow after glycemic control.   Disposition: Home

## 2021-09-23 NOTE — AIRWAY PLACEMENT NOTE ADULT - AIRWAY COMMENTS:
Upon arrival patient was getting CPR done, copious gastric secretions noted in oropharynx, airway suctioned, atraumatic intubation with glidescope, positive CO2, possibility of aspiration during chest compression. Upon arrival patient was getting CPR done, copious gastric secretions noted in oropharynx, airway suctioned, atraumatic intubation with glidescope, positive CO2, possibility of aspiration during chest compressions

## 2021-09-23 NOTE — CHART NOTE - NSCHARTNOTEFT_GEN_A_CORE
Code blue was called this morning at 8:20 AM. This morning around 6 AM intern was called for non specific EKG changes. EKG was done which was reported to be sinus without t wave changes. troponin drawn which was normal 0.01. Pt was asymptomatic. Around 8 AM RN administered insulin for hyperglycemia. Reportedly patient had black BM at that time, stool sample sent to lab. Pt was alert and conversive. 20 minutes later telemetry showed artifact so RN went into room to check leads. Pt was found unresponsive and pulseless. ACLS started immediately. I spoke with pt's daughter Amanda to notify her. She was irate using foul language and other expletives stating "you better get him back or else". After continued resuscitative measures patient ROSC was ultimately not achieved. I then called pt's wife Shruti as Amanda was no longer home. Shruti and Amanda who were now together at home started screaming again using foul language. They stated they are coming up to the floor "there better not be any policies restricting them". When they arrived they stated they believe if pt had been discharged two days ago if home o2 was available pt would not have passed as he would not have received additional dexamethasone. I tried to explain that while cause of death is unclear it appears that patient had GIB which started which may have been cause. I returned to pt's room several times through the morning to answer additional questions but conversation ultimately returned to the topic of dexamethasone. I met with pt's daughter Amanda yesterday afternoon as she was present in room after me leaving voicemail for her and we discussed continued insulin administration and monitoring of FS withc change of dexamethasone to prednisone to see if that would help hyperglycemia and if it didn't we could consult ID. I referred back to this conversation today when speaking with the five family members present at bedside who remained hostile and verbally aggressive to myself and staff.     To note pt had dramatic increase in leukocytosis to 26, drop in hgb 7.4, elevated anion gap 28, normal beta hydroxy-butyrate. DKA seems unlikely. Suspect elevated anion gap from lactic acidosis. Leukocytosis cause remains unclear be but could reactive to suspected bleed +/- steroids Code blue was called this morning at 8:20 AM. This morning around 6 AM intern was called for non specific EKG changes. EKG was done which was reported to be sinus without t wave changes. troponin drawn which was normal 0.01. Pt was asymptomatic. Around 8 AM RN administered insulin for hyperglycemia. Reportedly patient had black BM at that time, stool sample sent to lab. Pt was alert and conversive. 20 minutes later telemetry showed artifact so RN went into room to check leads. Pt was found unresponsive and pulseless. ACLS started immediately. I spoke with pt's daughter Amanda to notify her. She was irate using foul language and other expletives stating "you better get him back or else". After continued resuscitative measures patient ROSC was ultimately not achieved. I then called pt's wife Shruti as Amanda was no longer home. Shruti and Amanda who were now together at home started screaming again using foul language. They stated they are coming up to the floor "there better not be any policies restricting them". When they arrived they stated they believe if pt had been discharged two days ago if home o2 was available pt would not have passed as he would not have received additional dexamethasone. They also stated he didn't require steroids as his COVID-19 repeat test returned negative.  I tried to explain that while cause of death is unclear it appears that patient had GIB which started which may have been cause. I returned to pt's room several times through the morning to answer additional questions but conversation ultimately returned to the topic of dexamethasone. I met with pt's daughter Amanda yesterday afternoon as she was present in room after me leaving voicemail for her and we discussed continued insulin administration and monitoring of FS withc change of dexamethasone to prednisone to see if that would help hyperglycemia and if it didn't we could consult ID. I referred back to this conversation today when speaking with the five family members present at bedside who remained hostile and verbally aggressive to myself and staff.     To note pt had dramatic increase in leukocytosis to 26, drop in hgb 7.4, elevated anion gap 28, normal beta hydroxy-butyrate. DKA seems unlikely. Suspect elevated anion gap from lactic acidosis. Leukocytosis cause remains unclear be but could reactive to suspected bleed +/- steroids

## 2021-09-23 NOTE — CHART NOTE - NSCHARTNOTEFT_GEN_A_CORE
Notified by RN of non-specific changes on tele. STAT EKG was ordered and no acute changes were appreciated. Patient was seen and examined at bedside. Patient was oriented to name and place and denied any chest pain. At the time patient endorsed abdominal pain Notified by RN of non-specific changes on tele. STAT EKG was ordered and no acute changes were appreciated. Patient was seen and examined at bedside. Patient was oriented to name and place and denied any chest pain. At the time patient endorsed abdominal pain and neck pain. KUB was ordered to rule out any acute abdominal pathology.     Patient was also desaturating into 80% on 6LNC. 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 Saturating improved and patient is saturating 97% on 5LNC.     During initial assessment, POC glucose was 198 ; on reassessment POC Glucose increased to 308. Insulin lispro 10 was administered and given patient's abdominal discomfort and slight change in metal status; beta- hydroxybutyrate was ordered to r/o DKA     Follow up:  - Troponin  - Beta- hydroxy   - KUB

## 2021-09-23 NOTE — DISCHARGE NOTE FOR THE EXPIRED PATIENT - HOSPITAL COURSE
81 year old M patient with PMHx of CAD s/p CABG, DM, HTN, DLD presented to the ED for Shortness of breath of 2 weeks duration. Patient was doing relatively well until around 2 weeks prior to admission when he started experiencing shortness of breath. Patient started experiencing shortness of breath by the beginning of september and he got tested for COVID 09/03/2021 and it was positive. Since then the patient has been experiencing progressive dyspnea on exertion ( now occur at rest ). His SOB was associated with cough that was productive of clear sputum until one week prior to admission when he started noticing blood in his sputum. He stated that it only happens in the morning when he wakes up and it is at least ( 2 tablespoons ) in quantity. Patient denied any fevers but mentioned daily chills and headache. besides that patient denied chest pain, palpitation, nausea, vomiting or any other symptoms.     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 Supportive care on 6mg decadron for covid, with oxygen. Pt still desatting at room air and with ambulation, will need O2 on d/c. The plan was for patient to be d/c'd on prednosone 40mg for 10 days and home O2, will be switched to xarelto. Patient acutely decompensated and was found pulseless by RN, code blue was called. Time of death was called at 8:45 AM.  81 year old M patient with PMHx of CAD s/p CABG, DM, HTN, DLD presented to the ED for Shortness of breath of 2 weeks duration. Patient was doing relatively well until around 2 weeks prior to admission when he started experiencing shortness of breath. Patient started experiencing shortness of breath by the beginning of september and he got tested for COVID 09/03/2021 and it was positive. Since then the patient has been experiencing progressive dyspnea on exertion ( now occur at rest ). His SOB was associated with cough that was productive of clear sputum until one week prior to admission when he started noticing blood in his sputum. He stated that it only happens in the morning when he wakes up and it is at least ( 2 tablespoons ) in quantity. Patient denied any fevers but mentioned daily chills and headache. besides that patient denied chest pain, palpitation, nausea, vomiting or any other symptoms.     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 Supportive care on 6mg decadron for covid, with oxygen. Pt still desatting at room air and with ambulation, will need O2 on d/c. The plan was for patient to be d/c'd on prednosone 40mg for 10 days and home O2, will be switched to xarelto. Patient acutely decompensated and was found pulseless by RN, code blue was called. STAT labs resulted decreased Hb and elevated lactate suspicious for GIB. ACLS was unsuccessful. Time of death was called at 8:48 AM.  81 year old M patient with PMHx of CAD s/p CABG, DM, HTN, DLD presented to the ED for Shortness of breath of 2 weeks duration. Patient was doing relatively well until around 2 weeks prior to admission when he started experiencing shortness of breath. Patient started experiencing shortness of breath by the beginning of september and he got tested for COVID 09/03/2021 and it was positive. Since then the patient has been experiencing progressive dyspnea on exertion which progressed to rest. His SOB was associated with cough that was productive of clear sputum until one week prior to admission when he started noticing blood in his sputum. He stated that it only happens in the morning when he wakes up and it is at least ( 2 tablespoons ) in quantity. Patient denied any fevers but mentioned daily chills and headache. besides that patient denied chest pain, palpitation, nausea, vomiting or any other symptoms.     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 Supportive care on 6mg decadron for covid, with oxygen. Pt still desaturating at room air and with ambulation, will need O2 on d/c. The plan was for patient to be d/c'd on prednosone 40mg for 10 days and home O2, as well as xarelto. Patient acutely decompensated and was found pulseless by RN, code blue was called. STAT labs resulted decreased Hb suspicious for GIB. ACLS was unsuccessful. Time of death was called at 8:48 AM.

## 2021-09-23 NOTE — CHART NOTE - NSCHARTNOTEFT_GEN_A_CORE
Code blue called at 8:20 AM. Pt found to be pulseless by nurse. Pt received 7 rounds of epi, 2 amps bicarb, 2 doses calcium gluconate, 1 dose calcium chloride, 1 shock for Vfib. Rhythm remainder of time was PEA. Family was notified and aware. Code blue called at 8:20 AM. Pt found to be pulseless by nurse. Pt received 7 rounds of epi, 3 amps bicarb, 2 doses calcium gluconate, 1 dose calcium chloride, 1 shock for Vfib. Rhythm remainder of time was PEA. Family was notified and aware.

## 2021-10-01 DIAGNOSIS — I26.94 MULTIPLE SUBSEGMENTAL PULMONARY EMBOLI WITHOUT ACUTE COR PULMONALE: ICD-10-CM

## 2021-10-01 DIAGNOSIS — I25.10 ATHEROSCLEROTIC HEART DISEASE OF NATIVE CORONARY ARTERY WITHOUT ANGINA PECTORIS: ICD-10-CM

## 2021-10-01 DIAGNOSIS — U07.1 COVID-19: ICD-10-CM

## 2021-10-01 DIAGNOSIS — I10 ESSENTIAL (PRIMARY) HYPERTENSION: ICD-10-CM

## 2021-10-01 DIAGNOSIS — E11.65 TYPE 2 DIABETES MELLITUS WITH HYPERGLYCEMIA: ICD-10-CM

## 2021-10-01 DIAGNOSIS — Z95.1 PRESENCE OF AORTOCORONARY BYPASS GRAFT: ICD-10-CM

## 2021-10-01 DIAGNOSIS — J12.82 PNEUMONIA DUE TO CORONAVIRUS DISEASE 2019: ICD-10-CM

## 2021-10-01 DIAGNOSIS — R06.02 SHORTNESS OF BREATH: ICD-10-CM

## 2021-10-01 DIAGNOSIS — J96.01 ACUTE RESPIRATORY FAILURE WITH HYPOXIA: ICD-10-CM

## 2022-01-31 ENCOUNTER — APPOINTMENT (OUTPATIENT)
Dept: CARDIOLOGY | Facility: CLINIC | Age: 82
End: 2022-01-31

## 2023-03-06 NOTE — ED ADULT TRIAGE NOTE - WEIGHT IN LBS
199.9 Estlander Flap (Upper To Lower Lip) Text: The defect of the lower lip was assessed and measured.  Given the location and size of the defect, an Estlander flap was deemed most appropriate. Using a sterile surgical marker, an appropriate Estlander flap was measured and drawn on the upper lip. Local anesthesia was then infiltrated. A scalpel was then used to incise the lateral aspect of the flap, through skin, muscle and mucosa, leaving the flap pedicled medially.  The flap was then rotated and positioned to fill the lower lip defect.  The flap was then sutured into place with a three layer technique, closing the orbicularis oris muscle layer with subcutaneous buried sutures, followed by a mucosal layer and an epidermal layer.

## 2024-03-28 NOTE — PROGRESS NOTE ADULT - REASON FOR ADMISSION
Hemoptysis, COVID 19 PNA
no